# Patient Record
Sex: MALE | Race: ASIAN | NOT HISPANIC OR LATINO
[De-identification: names, ages, dates, MRNs, and addresses within clinical notes are randomized per-mention and may not be internally consistent; named-entity substitution may affect disease eponyms.]

---

## 2017-03-25 ENCOUNTER — APPOINTMENT (OUTPATIENT)
Dept: INTERNAL MEDICINE | Facility: CLINIC | Age: 37
End: 2017-03-25

## 2017-03-25 ENCOUNTER — LABORATORY RESULT (OUTPATIENT)
Age: 37
End: 2017-03-25

## 2017-03-25 VITALS
DIASTOLIC BLOOD PRESSURE: 80 MMHG | SYSTOLIC BLOOD PRESSURE: 118 MMHG | WEIGHT: 180 LBS | BODY MASS INDEX: 28.93 KG/M2 | HEIGHT: 66 IN

## 2017-03-30 LAB
ALBUMIN SERPL ELPH-MCNC: 4.5 G/DL
ALP BLD-CCNC: 72 U/L
ALT SERPL-CCNC: 66 U/L
ANION GAP SERPL CALC-SCNC: 15 MMOL/L
AST SERPL-CCNC: 38 U/L
BILIRUB SERPL-MCNC: 0.8 MG/DL
BUN SERPL-MCNC: 16 MG/DL
CALCIUM SERPL-MCNC: 9.8 MG/DL
CHLORIDE SERPL-SCNC: 100 MMOL/L
CHOLEST SERPL-MCNC: 189 MG/DL
CHOLEST/HDLC SERPL: 4 RATIO
CO2 SERPL-SCNC: 22 MMOL/L
CREAT SERPL-MCNC: 1.01 MG/DL
GLUCOSE SERPL-MCNC: 90 MG/DL
HDLC SERPL-MCNC: 47 MG/DL
LDLC SERPL CALC-MCNC: 117 MG/DL
POTASSIUM SERPL-SCNC: 4 MMOL/L
PROT SERPL-MCNC: 7.6 G/DL
SODIUM SERPL-SCNC: 137 MMOL/L
TRIGL SERPL-MCNC: 123 MG/DL
URATE SERPL-MCNC: 10.5 MG/DL

## 2017-07-18 ENCOUNTER — APPOINTMENT (OUTPATIENT)
Dept: INTERNAL MEDICINE | Facility: CLINIC | Age: 37
End: 2017-07-18

## 2017-07-18 VITALS
HEIGHT: 66 IN | DIASTOLIC BLOOD PRESSURE: 80 MMHG | SYSTOLIC BLOOD PRESSURE: 116 MMHG | WEIGHT: 185 LBS | BODY MASS INDEX: 29.73 KG/M2

## 2017-10-02 ENCOUNTER — RX RENEWAL (OUTPATIENT)
Age: 37
End: 2017-10-02

## 2017-11-11 ENCOUNTER — LABORATORY RESULT (OUTPATIENT)
Age: 37
End: 2017-11-11

## 2017-11-18 ENCOUNTER — APPOINTMENT (OUTPATIENT)
Dept: INTERNAL MEDICINE | Facility: CLINIC | Age: 37
End: 2017-11-18

## 2017-12-07 ENCOUNTER — APPOINTMENT (OUTPATIENT)
Dept: ORTHOPEDIC SURGERY | Facility: CLINIC | Age: 37
End: 2017-12-07
Payer: COMMERCIAL

## 2017-12-07 ENCOUNTER — APPOINTMENT (OUTPATIENT)
Dept: INTERNAL MEDICINE | Facility: CLINIC | Age: 37
End: 2017-12-07
Payer: COMMERCIAL

## 2017-12-07 VITALS
WEIGHT: 183 LBS | DIASTOLIC BLOOD PRESSURE: 93 MMHG | BODY MASS INDEX: 27.74 KG/M2 | SYSTOLIC BLOOD PRESSURE: 154 MMHG | HEIGHT: 68 IN | HEART RATE: 83 BPM

## 2017-12-07 PROCEDURE — 73562 X-RAY EXAM OF KNEE 3: CPT | Mod: LT

## 2017-12-07 PROCEDURE — 99203 OFFICE O/P NEW LOW 30 MIN: CPT

## 2017-12-07 PROCEDURE — G0008: CPT

## 2017-12-07 PROCEDURE — 90688 IIV4 VACCINE SPLT 0.5 ML IM: CPT

## 2017-12-09 ENCOUNTER — MOBILE ON CALL (OUTPATIENT)
Age: 37
End: 2017-12-09

## 2018-01-07 ENCOUNTER — EMERGENCY (EMERGENCY)
Facility: HOSPITAL | Age: 38
LOS: 1 days | Discharge: HOME | End: 2018-01-07
Attending: EMERGENCY MEDICINE | Admitting: EMERGENCY MEDICINE
Payer: COMMERCIAL

## 2018-01-07 VITALS
DIASTOLIC BLOOD PRESSURE: 87 MMHG | RESPIRATION RATE: 18 BRPM | TEMPERATURE: 98 F | SYSTOLIC BLOOD PRESSURE: 134 MMHG | OXYGEN SATURATION: 99 % | WEIGHT: 184.09 LBS | HEIGHT: 68 IN | HEART RATE: 87 BPM

## 2018-01-07 DIAGNOSIS — Z90.49 ACQUIRED ABSENCE OF OTHER SPECIFIED PARTS OF DIGESTIVE TRACT: Chronic | ICD-10-CM

## 2018-01-07 PROCEDURE — 73630 X-RAY EXAM OF FOOT: CPT

## 2018-01-07 PROCEDURE — 73590 X-RAY EXAM OF LOWER LEG: CPT | Mod: 26,LT

## 2018-01-07 PROCEDURE — 73630 X-RAY EXAM OF FOOT: CPT | Mod: 26,LT

## 2018-01-07 PROCEDURE — 27810 TREATMENT OF ANKLE FRACTURE: CPT | Mod: LT

## 2018-01-07 PROCEDURE — 73590 X-RAY EXAM OF LOWER LEG: CPT

## 2018-01-07 PROCEDURE — 73610 X-RAY EXAM OF ANKLE: CPT

## 2018-01-07 PROCEDURE — 73600 X-RAY EXAM OF ANKLE: CPT

## 2018-01-07 PROCEDURE — 99284 EMERGENCY DEPT VISIT MOD MDM: CPT

## 2018-01-07 PROCEDURE — 99285 EMERGENCY DEPT VISIT HI MDM: CPT | Mod: 25

## 2018-01-07 PROCEDURE — 73610 X-RAY EXAM OF ANKLE: CPT | Mod: 26,LT

## 2018-01-07 RX ORDER — ACETAMINOPHEN 500 MG
975 TABLET ORAL ONCE
Qty: 0 | Refills: 0 | Status: COMPLETED | OUTPATIENT
Start: 2018-01-07 | End: 2018-01-07

## 2018-01-07 RX ORDER — IBUPROFEN 200 MG
600 TABLET ORAL ONCE
Qty: 0 | Refills: 0 | Status: COMPLETED | OUTPATIENT
Start: 2018-01-07 | End: 2018-01-07

## 2018-01-07 RX ORDER — OXYCODONE HYDROCHLORIDE 5 MG/1
5 TABLET ORAL ONCE
Qty: 0 | Refills: 0 | Status: DISCONTINUED | OUTPATIENT
Start: 2018-01-07 | End: 2018-01-07

## 2018-01-07 RX ORDER — OXYCODONE HYDROCHLORIDE 5 MG/1
1 TABLET ORAL
Qty: 20 | Refills: 0 | OUTPATIENT
Start: 2018-01-07 | End: 2018-01-11

## 2018-01-07 RX ADMIN — Medication 600 MILLIGRAM(S): at 20:00

## 2018-01-07 RX ADMIN — Medication 975 MILLIGRAM(S): at 20:00

## 2018-01-07 RX ADMIN — OXYCODONE HYDROCHLORIDE 5 MILLIGRAM(S): 5 TABLET ORAL at 20:00

## 2018-01-07 NOTE — ED ADULT NURSE REASSESSMENT NOTE - NS ED NURSE REASSESS COMMENT FT1
Pt. instructed on how to use crutches. Pt. verbalized and was able to show RN proper use of crutches.

## 2018-01-07 NOTE — CONSULT NOTE ADULT - ASSESSMENT
37M with L bimalleolar equivalent ankle fracture  - NWB LLE in trilam splint  - Pain control  - Elevate  - Crutches for ambulation  - Follow-up with Dr. Sweet as requested by patient [phone]

## 2018-01-07 NOTE — ED PROVIDER NOTE - PLAN OF CARE
Follow up with orthopedics in 2-3 days, call [phone] for an appointment   Take ibuprofen 600 mg every 8 hours as needed for pain with food and plenty of water  Rest, ice or heat packs as needed for discomfort.  Follow up with your primary doctor within the next 1-2 days  If you have any worsening or changing symptoms such as confusion, loss of consciousness, worsening pain, nausea/vomiting, or if you have any other concerns please return to the emergency department

## 2018-01-07 NOTE — ED PROVIDER NOTE - PROGRESS NOTE DETAILS
Patient was evaluated by me, found in no acute distress, calm, speaking in complete sentences. Physical exam is concerning for left ankle sprain vs fracture. Left lower extremity found neurovascularly intact. X-rays ordered to assess for fractures/dislocations. I agree with resident assessment and plan. Dr. Carlos Cannon Pt's evaluated by ortho and applied splint by ortho. recommended to f/u with Dr. Sweet in this week. Pt's evaluated by ortho and applied splint by ortho. recommended to f/u with Dr. Sweet in this week. N/V- intact. crutches given with well demo.

## 2018-01-07 NOTE — ED PROVIDER NOTE - MEDICAL DECISION MAKING DETAILS
Male patient with past hx of HTN, brought to ED after injuring left ankle after slipping on ice earlier today. Physical exam is concerning for left ankle sprain vs fracture. Left lower extremity found neurovascularly intact. X-rays ordered to assess for fractures/dislocations.

## 2018-01-07 NOTE — ED PROVIDER NOTE - PHYSICAL EXAMINATION
NAD, VSS, Afebrile, No facial or scalp tender, No spinal tender, No RIB or CVA tender, + left ankle lat mal swelling, tender, diminished ROMs, no proximal tib/fib tender, No metatarsal tender, N/V- intact.

## 2018-01-07 NOTE — ED PROVIDER NOTE - CARE PLAN
Principal Discharge DX:	Ankle fracture, bimalleolar, closed, left, initial encounter Principal Discharge DX:	Ankle fracture, bimalleolar, closed, left, initial encounter  Instructions for follow-up, activity and diet:	Follow up with orthopedics in 2-3 days, call [phone] for an appointment   Take ibuprofen 600 mg every 8 hours as needed for pain with food and plenty of water  Rest, ice or heat packs as needed for discomfort.  Follow up with your primary doctor within the next 1-2 days  If you have any worsening or changing symptoms such as confusion, loss of consciousness, worsening pain, nausea/vomiting, or if you have any other concerns please return to the emergency department

## 2018-01-07 NOTE — CONSULT NOTE ADULT - SUBJECTIVE AND OBJECTIVE BOX
CC: L ankle pain  HPI:   37M s/p slip on ice with left ankle pain. States that he twisted his left ankle. Denies any other injuries. Denies head strike or LOC. No n/v, no numbness/tingling. Unable to weight bear afterwards.     Review of systems: As per HPI, otherwise negative.     PAST MEDICAL & SURGICAL HISTORY:  Hypertension  History of appendectomy    FAMILY HISTORY:      MEDICATIONS  (STANDING):    MEDICATIONS  (PRN):      T(C): 36.6 (01-07-18 @ 19:29), Max: 36.6 (01-07-18 @ 19:29)  HR: 87 (01-07-18 @ 19:29) (87 - 87)  BP: 134/87 (01-07-18 @ 19:29) (134/87 - 134/87)  RR: 18 (01-07-18 @ 19:29) (18 - 18)  SpO2: 99% (01-07-18 @ 19:29) (99% - 99%)  Wt(kg): --      EXAM:  Awake, Alert and in no acute distress  LLE ankle edematous  TTP lateral and medial malleoli  Skin intact no lesions  Compartments soft and compressible  EHL/FHL/TA/GS intact  SILT SP/DP/T/S/S  WWP brisk cap refill 2+ DP pulse    Imaging  XR L ankle/tibfib - bimalleolar equivalent ankle fracture    Procedure - 1% lidocaine intraarticular block, closed reduction, trilam splint  Post-proc PE - NVI  Post-proc XR - acceptable alignment

## 2018-01-07 NOTE — ED ADULT NURSE NOTE - OBJECTIVE STATEMENT
36 y/o male presents to the ED via wheelchair with wife. A&Ox3. C/O L ankle pain. Pt. states he fell and twisted his ankle. No LOC and pt. denies hitting head. +edema present of L ankle. +easy work of breathing. peripheral pulse present, cap refill<2 seconds. L leg elevated with ice pack placed. skin is intact, dry, and warm to touch. Pt. denies dizziness, headache, sob, chest pain, n/v/d, or numbness and tingling.

## 2018-01-09 ENCOUNTER — APPOINTMENT (OUTPATIENT)
Dept: ORTHOPEDIC SURGERY | Facility: CLINIC | Age: 38
End: 2018-01-09
Payer: COMMERCIAL

## 2018-01-09 ENCOUNTER — LABORATORY RESULT (OUTPATIENT)
Age: 38
End: 2018-01-09

## 2018-01-09 ENCOUNTER — APPOINTMENT (OUTPATIENT)
Dept: INTERNAL MEDICINE | Facility: CLINIC | Age: 38
End: 2018-01-09
Payer: COMMERCIAL

## 2018-01-09 VITALS
BODY MASS INDEX: 27.74 KG/M2 | DIASTOLIC BLOOD PRESSURE: 79 MMHG | HEIGHT: 68 IN | HEART RATE: 96 BPM | SYSTOLIC BLOOD PRESSURE: 120 MMHG | WEIGHT: 183 LBS

## 2018-01-09 VITALS
SYSTOLIC BLOOD PRESSURE: 110 MMHG | TEMPERATURE: 98.1 F | HEIGHT: 68 IN | BODY MASS INDEX: 27.74 KG/M2 | DIASTOLIC BLOOD PRESSURE: 70 MMHG | WEIGHT: 183 LBS

## 2018-01-09 PROCEDURE — 99215 OFFICE O/P EST HI 40 MIN: CPT

## 2018-01-09 PROCEDURE — 99214 OFFICE O/P EST MOD 30 MIN: CPT | Mod: 25

## 2018-01-09 PROCEDURE — 73610 X-RAY EXAM OF ANKLE: CPT | Mod: LT

## 2018-01-09 PROCEDURE — 93000 ELECTROCARDIOGRAM COMPLETE: CPT

## 2018-01-09 PROCEDURE — 36415 COLL VENOUS BLD VENIPUNCTURE: CPT

## 2018-01-10 ENCOUNTER — OUTPATIENT (OUTPATIENT)
Dept: OUTPATIENT SERVICES | Facility: HOSPITAL | Age: 38
LOS: 1 days | End: 2018-01-10
Payer: COMMERCIAL

## 2018-01-10 ENCOUNTER — TRANSCRIPTION ENCOUNTER (OUTPATIENT)
Age: 38
End: 2018-01-10

## 2018-01-10 VITALS
WEIGHT: 184.09 LBS | HEIGHT: 68 IN | SYSTOLIC BLOOD PRESSURE: 125 MMHG | DIASTOLIC BLOOD PRESSURE: 86 MMHG | OXYGEN SATURATION: 98 % | TEMPERATURE: 98 F | RESPIRATION RATE: 16 BRPM | HEART RATE: 87 BPM

## 2018-01-10 DIAGNOSIS — Z01.818 ENCOUNTER FOR OTHER PREPROCEDURAL EXAMINATION: ICD-10-CM

## 2018-01-10 DIAGNOSIS — Z90.49 ACQUIRED ABSENCE OF OTHER SPECIFIED PARTS OF DIGESTIVE TRACT: Chronic | ICD-10-CM

## 2018-01-10 DIAGNOSIS — S82.892A OTHER FRACTURE OF LEFT LOWER LEG, INITIAL ENCOUNTER FOR CLOSED FRACTURE: ICD-10-CM

## 2018-01-10 DIAGNOSIS — I10 ESSENTIAL (PRIMARY) HYPERTENSION: ICD-10-CM

## 2018-01-10 LAB
ALBUMIN SERPL ELPH-MCNC: 4.4 G/DL
ALP BLD-CCNC: 72 U/L
ALT SERPL-CCNC: 53 U/L
ANION GAP SERPL CALC-SCNC: 18 MMOL/L
APTT BLD: 30.6 SEC
AST SERPL-CCNC: 22 U/L
BASOPHILS # BLD AUTO: 0.03 K/UL
BASOPHILS NFR BLD AUTO: 0.2 %
BILIRUB SERPL-MCNC: 0.7 MG/DL
BUN SERPL-MCNC: 17 MG/DL
CALCIUM SERPL-MCNC: 9.8 MG/DL
CHLORIDE SERPL-SCNC: 97 MMOL/L
CO2 SERPL-SCNC: 22 MMOL/L
CREAT SERPL-MCNC: 0.95 MG/DL
EOSINOPHIL # BLD AUTO: 0.08 K/UL
EOSINOPHIL NFR BLD AUTO: 0.5 %
GLUCOSE SERPL-MCNC: 127 MG/DL
HCT VFR BLD CALC: 44.2 %
HGB BLD-MCNC: 14.5 G/DL
IMM GRANULOCYTES NFR BLD AUTO: 0.3 %
INR PPP: 0.97 RATIO
LYMPHOCYTES # BLD AUTO: 2.66 K/UL
LYMPHOCYTES NFR BLD AUTO: 17.5 %
MAN DIFF?: NORMAL
MCHC RBC-ENTMCNC: 28.9 PG
MCHC RBC-ENTMCNC: 32.8 GM/DL
MCV RBC AUTO: 88.2 FL
MONOCYTES # BLD AUTO: 0.94 K/UL
MONOCYTES NFR BLD AUTO: 6.2 %
NEUTROPHILS # BLD AUTO: 11.44 K/UL
NEUTROPHILS NFR BLD AUTO: 75.3 %
PLATELET # BLD AUTO: 301 K/UL
POTASSIUM SERPL-SCNC: 4.1 MMOL/L
PROT SERPL-MCNC: 8.3 G/DL
PT BLD: 10.9 SEC
RBC # BLD: 5.01 M/UL
RBC # FLD: 13.5 %
SODIUM SERPL-SCNC: 137 MMOL/L
WBC # FLD AUTO: 15.2 K/UL

## 2018-01-10 RX ORDER — CEFAZOLIN SODIUM 1 G
2000 VIAL (EA) INJECTION ONCE
Qty: 0 | Refills: 0 | Status: DISCONTINUED | OUTPATIENT
Start: 2018-01-12 | End: 2018-01-27

## 2018-01-10 RX ORDER — LISINOPRIL 2.5 MG/1
0 TABLET ORAL
Qty: 0 | Refills: 0 | COMMUNITY

## 2018-01-10 RX ORDER — HYDROCHLOROTHIAZIDE 25 MG
0 TABLET ORAL
Qty: 0 | Refills: 0 | COMMUNITY

## 2018-01-10 NOTE — H&P PST ADULT - MUSCULOSKELETAL COMMENTS
hx fall JAN 6,2018 sustained ankle facture seen in ER  sent home with meds for pain and immobilization of affected ankle left anle with lower leg hard open cast with ace bandage in place  distal toes with good circulation and movement , walks with 2 point crutches non bearing

## 2018-01-10 NOTE — H&P PST ADULT - HISTORY OF PRESENT ILLNESS
36 y/o male with history of hypertension, came in  for today for open reduction internal fixation of left ankle. Patient sustained injury due to fall Jan 6,2018, Patient was seen in ER had Xray done positive for fracture, affected site immobilizes with open lower leg cast and ace bandage dressing and crutches, was  referred to Dr Nazario evaluated and scheduled this surgical  procedure for treatment .Seen by PMD 1/9/2018 blood test done WBC elevated due to fracture ok for surgery will follow-up post op. 36 y/o male with history of hypertension, came in  for PST  today for open reduction internal fixation of left ankle. Patient sustained injury due to fall Jan 6,2018, Patient was seen in ER had Xray done positive for fracture, affected site immobilizes with open lower leg cast and ace bandage dressing and crutches, was  referred to Dr Nazario evaluated and scheduled this surgical  procedure for treatment .Seen by PMD 1/9/2018 blood test done WBC elevated due to fracture ok for surgery will follow-up post op.

## 2018-01-10 NOTE — H&P PST ADULT - PMH
Fatty liver  benign follow-up by PMD  Hypertension  on medication Fatty liver  benign follow-up by PMD  Gout  on PMDS note /stable as per patient  Hypertension  on medication

## 2018-01-10 NOTE — H&P PST ADULT - PROBLEM SELECTOR PLAN 1
Open reduction internal fixation left ankle   CBC/BMP result on HIE done by PMD printed WBC elevated due to stress fracture as per PMDS note   To continue taking pain meds as needed for control of pain    Continue left ankle immobilization hard open leg cast  for support and movement uses two point weight bearing crutches for mobilization   To watch affected distal toes for color and movement and to call surgeon PRN of any changes in color or become /cyanotic   PSt instruction given to patient verbalizes understanding antibacterial soap given , prophylaxis for infection post.op

## 2018-01-10 NOTE — H&P PST ADULT - EXTREMITIES COMMENTS
left ankle immobilized with lower leg below the knee open cast with ace bandage wrap, distal toes with mild swelling with good movement and circulation

## 2018-01-10 NOTE — H&P PST ADULT - NSANTHOSAYNRD_GEN_A_CORE
No. BEBE screening performed.  STOP BANG Legend: 0-2 = LOW Risk; 3-4 = INTERMEDIATE Risk; 5-8 = HIGH Risk

## 2018-01-11 ENCOUNTER — TRANSCRIPTION ENCOUNTER (OUTPATIENT)
Age: 38
End: 2018-01-11

## 2018-01-12 ENCOUNTER — TRANSCRIPTION ENCOUNTER (OUTPATIENT)
Age: 38
End: 2018-01-12

## 2018-01-12 ENCOUNTER — OUTPATIENT (OUTPATIENT)
Dept: OUTPATIENT SERVICES | Facility: HOSPITAL | Age: 38
LOS: 1 days | End: 2018-01-12
Payer: COMMERCIAL

## 2018-01-12 ENCOUNTER — APPOINTMENT (OUTPATIENT)
Dept: ORTHOPEDIC SURGERY | Facility: HOSPITAL | Age: 38
End: 2018-01-12

## 2018-01-12 VITALS
SYSTOLIC BLOOD PRESSURE: 118 MMHG | DIASTOLIC BLOOD PRESSURE: 74 MMHG | HEART RATE: 87 BPM | WEIGHT: 184.09 LBS | TEMPERATURE: 98 F | OXYGEN SATURATION: 96 % | HEIGHT: 68 IN | RESPIRATION RATE: 18 BRPM

## 2018-01-12 VITALS — HEART RATE: 84 BPM | DIASTOLIC BLOOD PRESSURE: 66 MMHG | OXYGEN SATURATION: 99 % | SYSTOLIC BLOOD PRESSURE: 128 MMHG

## 2018-01-12 DIAGNOSIS — S82.892A OTHER FRACTURE OF LEFT LOWER LEG, INITIAL ENCOUNTER FOR CLOSED FRACTURE: ICD-10-CM

## 2018-01-12 DIAGNOSIS — Z90.49 ACQUIRED ABSENCE OF OTHER SPECIFIED PARTS OF DIGESTIVE TRACT: Chronic | ICD-10-CM

## 2018-01-12 DIAGNOSIS — Z01.818 ENCOUNTER FOR OTHER PREPROCEDURAL EXAMINATION: ICD-10-CM

## 2018-01-12 PROCEDURE — 27814 TREATMENT OF ANKLE FRACTURE: CPT | Mod: LT

## 2018-01-12 PROCEDURE — 76000 FLUOROSCOPY <1 HR PHYS/QHP: CPT

## 2018-01-12 PROCEDURE — 27829 TREAT LOWER LEG JOINT: CPT | Mod: LT

## 2018-01-12 PROCEDURE — C1713: CPT

## 2018-01-12 PROCEDURE — 27792 TREATMENT OF ANKLE FRACTURE: CPT | Mod: LT

## 2018-01-12 PROCEDURE — G0463: CPT

## 2018-01-12 RX ORDER — ASPIRIN/CALCIUM CARB/MAGNESIUM 324 MG
1 TABLET ORAL
Qty: 30 | Refills: 0 | OUTPATIENT
Start: 2018-01-12 | End: 2018-02-10

## 2018-01-12 RX ORDER — ONDANSETRON 8 MG/1
4 TABLET, FILM COATED ORAL ONCE
Qty: 0 | Refills: 0 | Status: DISCONTINUED | OUTPATIENT
Start: 2018-01-12 | End: 2018-01-27

## 2018-01-12 RX ORDER — SODIUM CHLORIDE 9 MG/ML
1000 INJECTION, SOLUTION INTRAVENOUS
Qty: 0 | Refills: 0 | Status: DISCONTINUED | OUTPATIENT
Start: 2018-01-12 | End: 2018-01-27

## 2018-01-12 RX ORDER — SODIUM CHLORIDE 9 MG/ML
3 INJECTION INTRAMUSCULAR; INTRAVENOUS; SUBCUTANEOUS EVERY 8 HOURS
Qty: 0 | Refills: 0 | Status: DISCONTINUED | OUTPATIENT
Start: 2018-01-12 | End: 2018-01-12

## 2018-01-12 RX ORDER — OXYCODONE HYDROCHLORIDE 5 MG/1
1 TABLET ORAL
Qty: 16 | Refills: 0 | OUTPATIENT
Start: 2018-01-12 | End: 2018-01-15

## 2018-01-12 RX ORDER — LIDOCAINE HCL 20 MG/ML
0.2 VIAL (ML) INJECTION ONCE
Qty: 0 | Refills: 0 | Status: DISCONTINUED | OUTPATIENT
Start: 2018-01-12 | End: 2018-01-12

## 2018-01-12 RX ORDER — HYDROMORPHONE HYDROCHLORIDE 2 MG/ML
0.5 INJECTION INTRAMUSCULAR; INTRAVENOUS; SUBCUTANEOUS
Qty: 0 | Refills: 0 | Status: DISCONTINUED | OUTPATIENT
Start: 2018-01-12 | End: 2018-01-12

## 2018-01-12 RX ADMIN — HYDROMORPHONE HYDROCHLORIDE 0.5 MILLIGRAM(S): 2 INJECTION INTRAMUSCULAR; INTRAVENOUS; SUBCUTANEOUS at 17:00

## 2018-01-12 RX ADMIN — HYDROMORPHONE HYDROCHLORIDE 0.5 MILLIGRAM(S): 2 INJECTION INTRAMUSCULAR; INTRAVENOUS; SUBCUTANEOUS at 17:30

## 2018-01-12 NOTE — ASU DISCHARGE PLAN (ADULT/PEDIATRIC). - MEDICATION SUMMARY - MEDICATIONS TO TAKE
I will START or STAY ON the medications listed below when I get home from the hospital:    Ecotrin 325 mg oral delayed release tablet  -- 1 tab(s) by mouth once a day   -- Swallow whole.  Do not crush.  Take with food or milk.    -- Indication: For Other fracture of left lower leg, initial encounter for closed fracture    oxyCODONE 5 mg oral tablet  -- 1 tab(s) by mouth every 6 hours MDD:4  -- Caution federal law prohibits the transfer of this drug to any person other  than the person for whom it was prescribed.  It is very important that you take or use this exactly as directed.  Do not skip doses or discontinue unless directed by your doctor.  May cause drowsiness.  Alcohol may intensify this effect.  Use care when operating dangerous machinery.  This prescription cannot be refilled.  Using more of this medication than prescribed may cause serious breathing problems.    -- Indication: For Other fracture of left lower leg, initial encounter for closed fracture

## 2018-01-12 NOTE — BRIEF OPERATIVE NOTE - PROCEDURE
<<-----Click on this checkbox to enter Procedure Ankle surgery  01/12/2018  Left lateral malleolar ORIF  Active  ANGELA

## 2018-01-12 NOTE — ASU DISCHARGE PLAN (ADULT/PEDIATRIC). - SPECIAL INSTRUCTIONS
Patient was advised to follow-up with their surgeon in 1-2 weeks and call the office with any questions or concerns.

## 2018-01-23 ENCOUNTER — APPOINTMENT (OUTPATIENT)
Dept: ORTHOPEDIC SURGERY | Facility: CLINIC | Age: 38
End: 2018-01-23
Payer: COMMERCIAL

## 2018-01-23 PROCEDURE — 99024 POSTOP FOLLOW-UP VISIT: CPT

## 2018-01-23 PROCEDURE — 73610 X-RAY EXAM OF ANKLE: CPT | Mod: LT

## 2018-02-12 ENCOUNTER — APPOINTMENT (OUTPATIENT)
Dept: ORTHOPEDIC SURGERY | Facility: CLINIC | Age: 38
End: 2018-02-12
Payer: COMMERCIAL

## 2018-02-12 PROCEDURE — 99024 POSTOP FOLLOW-UP VISIT: CPT

## 2018-02-12 PROCEDURE — 73610 X-RAY EXAM OF ANKLE: CPT | Mod: LT

## 2018-03-07 ENCOUNTER — APPOINTMENT (OUTPATIENT)
Dept: ORTHOPEDIC SURGERY | Facility: CLINIC | Age: 38
End: 2018-03-07
Payer: COMMERCIAL

## 2018-03-07 PROCEDURE — 99024 POSTOP FOLLOW-UP VISIT: CPT

## 2018-03-12 ENCOUNTER — INPATIENT (INPATIENT)
Facility: HOSPITAL | Age: 38
LOS: 6 days | Discharge: HOME | DRG: 857 | End: 2018-03-19
Attending: ORTHOPAEDIC SURGERY | Admitting: ORTHOPAEDIC SURGERY
Payer: COMMERCIAL

## 2018-03-12 ENCOUNTER — APPOINTMENT (OUTPATIENT)
Dept: ORTHOPEDIC SURGERY | Facility: CLINIC | Age: 38
End: 2018-03-12
Payer: COMMERCIAL

## 2018-03-12 VITALS
OXYGEN SATURATION: 98 % | RESPIRATION RATE: 18 BRPM | HEART RATE: 95 BPM | TEMPERATURE: 99 F | DIASTOLIC BLOOD PRESSURE: 86 MMHG | SYSTOLIC BLOOD PRESSURE: 135 MMHG

## 2018-03-12 DIAGNOSIS — Z90.49 ACQUIRED ABSENCE OF OTHER SPECIFIED PARTS OF DIGESTIVE TRACT: Chronic | ICD-10-CM

## 2018-03-12 PROCEDURE — 73610 X-RAY EXAM OF ANKLE: CPT | Mod: LT

## 2018-03-12 PROCEDURE — 99285 EMERGENCY DEPT VISIT HI MDM: CPT | Mod: 25

## 2018-03-12 PROCEDURE — 99024 POSTOP FOLLOW-UP VISIT: CPT

## 2018-03-12 NOTE — ED ADULT NURSE NOTE - PMH
Fatty liver  benign follow-up by PMD  Gout  on PMDS note /stable as per patient  Hypertension  on medication

## 2018-03-12 NOTE — ED ADULT NURSE NOTE - OBJECTIVE STATEMENT
37 year old male came into the ER via walk-in with c/o left foot/ankle swelling s/p ankle fx surgery in january. Pt states he had a fever approx 2 weeks ago, but nothing since then. 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 Pt was seen at orthopedist outpatient today who noted edema and sent pt to ER. Upon arrival to ER, pt left foot and ankle swollen, non pitting, limited ROM. Pt to be seen by ortho/surgery for hardware cleaning. Pt has no c/o pain at this time, no CP, SOB, ABD pain, N/V/D, HA, fever or chills noted at this time. Comfort and safety maintained.

## 2018-03-13 DIAGNOSIS — T81.4XXA INFECTION FOLLOWING A PROCEDURE, INITIAL ENCOUNTER: ICD-10-CM

## 2018-03-13 LAB
ALBUMIN SERPL ELPH-MCNC: 4.3 G/DL — SIGNIFICANT CHANGE UP (ref 3.3–5)
ALP SERPL-CCNC: 97 U/L — SIGNIFICANT CHANGE UP (ref 40–120)
ALT FLD-CCNC: 38 U/L RC — SIGNIFICANT CHANGE UP (ref 10–45)
ANION GAP SERPL CALC-SCNC: 16 MMOL/L — SIGNIFICANT CHANGE UP (ref 5–17)
APPEARANCE UR: CLEAR — SIGNIFICANT CHANGE UP
APTT BLD: 35.3 SEC — SIGNIFICANT CHANGE UP (ref 27.5–37.4)
AST SERPL-CCNC: 18 U/L — SIGNIFICANT CHANGE UP (ref 10–40)
BASOPHILS # BLD AUTO: 0.2 K/UL — SIGNIFICANT CHANGE UP (ref 0–0.2)
BASOPHILS NFR BLD AUTO: 1.3 % — SIGNIFICANT CHANGE UP (ref 0–2)
BILIRUB SERPL-MCNC: 0.4 MG/DL — SIGNIFICANT CHANGE UP (ref 0.2–1.2)
BILIRUB UR-MCNC: NEGATIVE — SIGNIFICANT CHANGE UP
BLD GP AB SCN SERPL QL: NEGATIVE — SIGNIFICANT CHANGE UP
BUN SERPL-MCNC: 15 MG/DL — SIGNIFICANT CHANGE UP (ref 7–23)
CALCIUM SERPL-MCNC: 10.5 MG/DL — SIGNIFICANT CHANGE UP (ref 8.4–10.5)
CHLORIDE SERPL-SCNC: 99 MMOL/L — SIGNIFICANT CHANGE UP (ref 96–108)
CO2 SERPL-SCNC: 24 MMOL/L — SIGNIFICANT CHANGE UP (ref 22–31)
COLOR SPEC: YELLOW — SIGNIFICANT CHANGE UP
CREAT SERPL-MCNC: 0.97 MG/DL — SIGNIFICANT CHANGE UP (ref 0.5–1.3)
CRP SERPL-MCNC: 0.8 MG/DL — HIGH (ref 0–0.4)
DIFF PNL FLD: NEGATIVE — SIGNIFICANT CHANGE UP
EOSINOPHIL # BLD AUTO: 0.3 K/UL — SIGNIFICANT CHANGE UP (ref 0–0.5)
EOSINOPHIL NFR BLD AUTO: 1.8 % — SIGNIFICANT CHANGE UP (ref 0–6)
ERYTHROCYTE [SEDIMENTATION RATE] IN BLOOD: 28 MM/HR — HIGH (ref 0–15)
GLUCOSE SERPL-MCNC: 86 MG/DL — SIGNIFICANT CHANGE UP (ref 70–99)
GLUCOSE UR QL: NEGATIVE — SIGNIFICANT CHANGE UP
HCT VFR BLD CALC: 42.1 % — SIGNIFICANT CHANGE UP (ref 39–50)
HGB BLD-MCNC: 14.5 G/DL — SIGNIFICANT CHANGE UP (ref 13–17)
INR BLD: 1.09 RATIO — SIGNIFICANT CHANGE UP (ref 0.88–1.16)
KETONES UR-MCNC: NEGATIVE — SIGNIFICANT CHANGE UP
LEUKOCYTE ESTERASE UR-ACNC: NEGATIVE — SIGNIFICANT CHANGE UP
LYMPHOCYTES # BLD AUTO: 33.5 % — SIGNIFICANT CHANGE UP (ref 13–44)
LYMPHOCYTES # BLD AUTO: 4.6 K/UL — HIGH (ref 1–3.3)
MCHC RBC-ENTMCNC: 30.6 PG — SIGNIFICANT CHANGE UP (ref 27–34)
MCHC RBC-ENTMCNC: 34.5 GM/DL — SIGNIFICANT CHANGE UP (ref 32–36)
MCV RBC AUTO: 88.8 FL — SIGNIFICANT CHANGE UP (ref 80–100)
MONOCYTES # BLD AUTO: 1 K/UL — HIGH (ref 0–0.9)
MONOCYTES NFR BLD AUTO: 7.5 % — SIGNIFICANT CHANGE UP (ref 2–14)
NEUTROPHILS # BLD AUTO: 7.7 K/UL — HIGH (ref 1.8–7.4)
NEUTROPHILS NFR BLD AUTO: 55.9 % — SIGNIFICANT CHANGE UP (ref 43–77)
NITRITE UR-MCNC: NEGATIVE — SIGNIFICANT CHANGE UP
PH UR: 7 — SIGNIFICANT CHANGE UP (ref 5–8)
PLATELET # BLD AUTO: 505 K/UL — HIGH (ref 150–400)
POTASSIUM SERPL-MCNC: 4.1 MMOL/L — SIGNIFICANT CHANGE UP (ref 3.5–5.3)
POTASSIUM SERPL-SCNC: 4.1 MMOL/L — SIGNIFICANT CHANGE UP (ref 3.5–5.3)
PROT SERPL-MCNC: 8.6 G/DL — HIGH (ref 6–8.3)
PROT UR-MCNC: NEGATIVE — SIGNIFICANT CHANGE UP
PROTHROM AB SERPL-ACNC: 11.9 SEC — SIGNIFICANT CHANGE UP (ref 9.8–12.7)
RBC # BLD: 4.75 M/UL — SIGNIFICANT CHANGE UP (ref 4.2–5.8)
RBC # FLD: 12 % — SIGNIFICANT CHANGE UP (ref 10.3–14.5)
RH IG SCN BLD-IMP: POSITIVE — SIGNIFICANT CHANGE UP
RH IG SCN BLD-IMP: POSITIVE — SIGNIFICANT CHANGE UP
SODIUM SERPL-SCNC: 139 MMOL/L — SIGNIFICANT CHANGE UP (ref 135–145)
SP GR SPEC: 1.01 — SIGNIFICANT CHANGE UP (ref 1.01–1.02)
UROBILINOGEN FLD QL: NEGATIVE — SIGNIFICANT CHANGE UP
WBC # BLD: 13.8 K/UL — HIGH (ref 3.8–10.5)
WBC # FLD AUTO: 13.8 K/UL — HIGH (ref 3.8–10.5)

## 2018-03-13 PROCEDURE — 71045 X-RAY EXAM CHEST 1 VIEW: CPT | Mod: 26

## 2018-03-13 PROCEDURE — 73610 X-RAY EXAM OF ANKLE: CPT | Mod: 26,LT

## 2018-03-13 RX ORDER — ACETAMINOPHEN 500 MG
650 TABLET ORAL EVERY 6 HOURS
Qty: 0 | Refills: 0 | Status: DISCONTINUED | OUTPATIENT
Start: 2018-03-13 | End: 2018-03-14

## 2018-03-13 RX ORDER — SODIUM CHLORIDE 9 MG/ML
1000 INJECTION INTRAMUSCULAR; INTRAVENOUS; SUBCUTANEOUS
Qty: 0 | Refills: 0 | Status: DISCONTINUED | OUTPATIENT
Start: 2018-03-13 | End: 2018-03-14

## 2018-03-13 RX ORDER — SODIUM CHLORIDE 9 MG/ML
1000 INJECTION INTRAMUSCULAR; INTRAVENOUS; SUBCUTANEOUS
Qty: 0 | Refills: 0 | Status: DISCONTINUED | OUTPATIENT
Start: 2018-03-13 | End: 2018-03-13

## 2018-03-13 RX ORDER — DOCUSATE SODIUM 100 MG
100 CAPSULE ORAL THREE TIMES A DAY
Qty: 0 | Refills: 0 | Status: DISCONTINUED | OUTPATIENT
Start: 2018-03-13 | End: 2018-03-14

## 2018-03-13 RX ORDER — OXYCODONE HYDROCHLORIDE 5 MG/1
5 TABLET ORAL EVERY 4 HOURS
Qty: 0 | Refills: 0 | Status: DISCONTINUED | OUTPATIENT
Start: 2018-03-13 | End: 2018-03-14

## 2018-03-13 RX ORDER — OXYCODONE HYDROCHLORIDE 5 MG/1
10 TABLET ORAL EVERY 4 HOURS
Qty: 0 | Refills: 0 | Status: DISCONTINUED | OUTPATIENT
Start: 2018-03-13 | End: 2018-03-14

## 2018-03-13 RX ORDER — DIPHENHYDRAMINE HCL 50 MG
25 CAPSULE ORAL AT BEDTIME
Qty: 0 | Refills: 0 | Status: DISCONTINUED | OUTPATIENT
Start: 2018-03-13 | End: 2018-03-14

## 2018-03-13 RX ORDER — FOLIC ACID 0.8 MG
1 TABLET ORAL DAILY
Qty: 0 | Refills: 0 | Status: DISCONTINUED | OUTPATIENT
Start: 2018-03-13 | End: 2018-03-14

## 2018-03-13 RX ORDER — LISINOPRIL/HYDROCHLOROTHIAZIDE 10-12.5 MG
0 TABLET ORAL
Qty: 0 | Refills: 0 | COMMUNITY

## 2018-03-13 RX ORDER — ASCORBIC ACID 60 MG
500 TABLET,CHEWABLE ORAL
Qty: 0 | Refills: 0 | Status: DISCONTINUED | OUTPATIENT
Start: 2018-03-13 | End: 2018-03-14

## 2018-03-13 RX ADMIN — Medication 1 TABLET(S): at 12:40

## 2018-03-13 RX ADMIN — SODIUM CHLORIDE 75 MILLILITER(S): 9 INJECTION INTRAMUSCULAR; INTRAVENOUS; SUBCUTANEOUS at 04:02

## 2018-03-13 RX ADMIN — Medication 1 MILLIGRAM(S): at 12:39

## 2018-03-13 NOTE — CONSULT NOTE ADULT - ASSESSMENT
37y male with PMH significant for HTN   Admitted to NS on 1/07/18 post fall & found to have bimalleolar fracture of the ankle.   S/p ORIF on 3/12/18.   On 3/02/18 he noted that left ankle was unusually painful, followed by redness & swelling, furthermore, several days later incision dehisced slightly & pus came out  Started on Keflex on 3/14/18  Seen by orthopedic surgeon on 3/12/18 & now hospitalized for concerns of infected hardware  Redness of ankle & edema improved remarkably on Keflex - suspect a Keflex susceptible pathogen    Plan:   Agree with plans for I & D  ?? MARIA ELENA  Continue to hold additional Keflex/ antibiotics to allow for better yield of OR cx   Thanks

## 2018-03-13 NOTE — CONSULT NOTE ADULT - ATTENDING COMMENTS
The patient is medically stable, medically optimized and has no medical contraindication to surgery tomorrow as required. Exam time 70 minutes including > than 50 % for bedside discussion and counseling.

## 2018-03-13 NOTE — ED PROVIDER NOTE - OBJECTIVE STATEMENT
36 y/o male hx of HTN on Lisinopril and HCTZ, recent L ankle surgery on 1/12/18 p/w infection. Per patient, saw ortho today in clinic - told to come here for admission. Reports fever 2 weeks ago, resolved. Denies any pain. Patient still non weight bearing. Was started on abx Keflex - took total of 2 doses.  Surgeon: Dr. Mika Diaz 38 y/o male hx of HTN on Lisinopril and HCTZ, recent L ankle surgery on 1/12/18 p/w infection. Per patient, saw ortho today in clinic - told to come here for admission. Reports fever 2 weeks ago, resolved. Denies any pain. Patient still non weight bearing. Was started on abx Keflex - took total of 2 doses.  Surgeon: Dr. Mika Danielle

## 2018-03-13 NOTE — ED PROVIDER NOTE - ATTENDING CONTRIBUTION TO CARE
36 y/o male hx of HTN on Lisinopril and HCTZ, recent L ankle surgery on 1/12/18 p/w infection. Per patient, saw ortho today in clinic - told to come here for admission for wash out of his ankle, plain films, labs, abx and ortho admission. leg with redness, puncture post op sites with drainge as per patient but non noted in ed. vss.

## 2018-03-13 NOTE — ED PROVIDER NOTE - MEDICAL DECISION MAKING DETAILS
Zeke Ellington (Resident): 2 months ago, L ankle surgery now pw likely hardware infection - looks well, non toxic - mild erythema w/o discharge at L ankle - will check pre-op labs, d/w ortho, and admit for abx

## 2018-03-13 NOTE — H&P ADULT - NSHPPHYSICALEXAM_GEN_ALL_CORE
AVSS  Gen: NAD  LLE:  Sm open areas of drainage at proximal and distal aspects of wound with surrounding erythma, unable to express any pustulant drainage  SILT L2-S1  +EHL/FHL/TA/Gastroc  DP+  Soft compartments, - calf ttp AVSS  Gen: NAD  LLE:  Sm open areas of drainage at proximal and distal aspects of wound with surrounding erythma, unable to express any pustulant drainage, residual serofibrinous drainage  SILT L2-S1  +EHL/FHL/TA/Gastroc  DP+  Soft compartments, - calf ttp

## 2018-03-13 NOTE — CONSULT NOTE ADULT - SUBJECTIVE AND OBJECTIVE BOX
CONSULTING SERVICE:  Internal Medicine.    HISTORY OF PRESENT ILLNESS:  The patient is a 37-year-old male who was hospitalized on 2018 after accidental fall slipping and tripping on ice with left ankle fracture.  He underwent a successful ORIF 2 months ago and was feeling well until 2 weeks ago when he noted erythema of the left foot.  He delayed orthopedic followup examination and was seen and found to have cellulitis of the foot.  He was started on Keflex therapy.  However, continuous drainage occurred from that time forward.  At the present time, the patient has swelling and oozing of fluids from his left ankle with persistent erythema.  The patient was admitted for removal of hardware and wound irrigation to be performed by Dr. Nazario.    PAST MEDICAL HISTORY:  Includes hypertension.    MEDICATIONS:  Include 20 mg of lisinopril daily and 12.5 mg of hydrochlorothiazide daily.    ALLERGIES:  HE HAS NO KNOWN ALLERGIES.    PAST SURGICAL HISTORY:  Significant for appendectomy in , left ankle ORIF in 2018.    FAMILY HISTORY:  Positive for father with pancreatic carcinoma  at the age of 66 and mother and father both with hypertension.  The patient follows a regular diet.  His present weight is 165 pounds.      SOCIAL HISTORY:  He is a nonsmoker, nondrinker with no drug history.  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    REVIEW OF SYSTEMS:  Essentially normal.  He has no headaches or dizziness.  No changes in hearing or vision.  No sinusitis or dental disease.  He has no difficulty swallowing.  He has no shortness of breath, cough, congestion or wheezing.  He has no chest pain, palpitations or arrhythmias.  He has no abdominal pain, change in bowel habits or GI bleeding.  He has no dysuria, frequency or incontinence.  He has no rashes or skin disease.  He only has left ankle orthopedic discomfort with difficulty walking and persistent erythema and drainage.    PHYSICAL EXAMINATION:  VITAL SIGNS:  Blood pressure is 120/70, pulse of 68, and respirations 68.  HEAD AND NECK:  Normal.  CHEST:  Clear.  CARDIAC:  Normal.  ABDOMEN:  Soft with no masses, tenderness, guarding or rebound.  EXTREMITIES:  His left foot is opacified with an Ace bandage to limit movement.  He has distal erythema at the incision site.    LABORATORY DATA:  Laboratories performed show sedimentation of 28.  CBC, hemoglobin of 14.5, hematocrit 42.1, white count is 13.8, and platelet count is 505,000.  INR is 1.09.  PTT is 35.3.  C-reactive protein is elevated at 0.8.  Sodium is 139, potassium 4.1, chloride 99, CO2 24, BU6N is 15, creatinine 0.97, and blood sugars 86.  Liver function tests were normal.    DIAGNOSTIC DATA:  Chest x-ray performed shows clear lungs with no infiltrates.  EKG performed is normal with normal sinus rhythm.    IMPRESSION AND PLAN:  The impression at this time is the patient is 2 months status post open reduction and internal fixation left ankle with new onset of cellulitis and drainage from the operative site.  The patient does not have established septic arthritis at the present time and has recently completed a course of Keflex and is off antibiotics.  He is scheduled for OR removal of hardware, OR washout and OR cultures to determine the best mode of therapy from this point forward.  The patient is medically stable and has no medical contraindications to surgery as is required.                He will continue to have postoperative medical management to lessen the possibility of postoperative complications.      _______________________    DICT:	LUBA MARLEY MD  ([id number]) 2018 02:33 PM  TRANS:	[id number] 2018 02:40 PM  JOB:	[id number]

## 2018-03-13 NOTE — H&P ADULT - ASSESSMENT
37M with L ankle surgical site infection  -admit to ortho  -pain control  -NWB  -plan for OR tomorrow evening  -NPO and hold anticoag for OR  -F/u labs and preop testing  -will obtain med clearance in AM  -Discussed with Dr Loya, agrees with above plan 37M with L ankle surgical site infection  -admit to ortho  -pain control  -NWB  -plan for OR (I+D L ankle wound / infection, potential MARIA ELENA and wound VAC application)  -NPO and hold anticoag for OR  -F/u labs and preop testing  -will obtain med clearance in AM  -ID service consultation  -Discussed with Dr Loya, agrees with above plan

## 2018-03-13 NOTE — H&P ADULT - HISTORY OF PRESENT ILLNESS
37M sent to ED for evaluation of L ankle surgical incision leakage/erythema, s/p L ankle ORIF with Dr Loya 1/12/18. He first noticed the redness a few weeks ago, along with some clear oozing from the incision. He was seen by Dr Loya in the office today, and sent to ED I&D. Pt denies any numbnes/tingling to affected extremity. No fevers/chills. No other complaints. 37M sent to ED for evaluation of L ankle surgical incision leakage/erythema, s/p L ankle ORIF with Dr Loya 1/12/18. He first noticed the redness a few weeks ago, along with some clear oozing from the incision. He was seen by Dr Loya in the office today, and sent to ED I&D. Pt denies any numbnes/tingling to affected extremity. No fevers/chills. No other complaints.  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

## 2018-03-13 NOTE — CONSULT NOTE ADULT - SUBJECTIVE AND OBJECTIVE BOX
The patient was seen and examined today. He has a history of accidental fall 01/12/18 with ORIF of the left ankle. Doing well until 2 weeks ago with new onset of left foot erythema and patient was started on oral Keflex with improvement. Now with residual incisional erythema and drainage and patient has been advise removal of hardware, O.R. washout and cultures off antibiotics, at this time. Only comorbidity is controlled hypertension. Exam, lab, EKG and CXR are stable.  The patient is medically stable, medically optimized and has no medical contraindication to surgery later today or tomorrow as required. Exam time 70 minutes including > than 50 % for bedside discussion and counseling. Comprehensive consultation dictated # [id number].

## 2018-03-13 NOTE — ED PROVIDER NOTE - MUSCULOSKELETAL, MLM
L ankle w/ 2 erythematous surgical wounds on L lateral ankle no oozing, no tenderness, mild surrounding erythema. Dec ROM of L ankle w/ inversion and eversion. +2 DP pulses bilaterally

## 2018-03-13 NOTE — CONSULT NOTE ADULT - SUBJECTIVE AND OBJECTIVE BOX
HPI:   Patient is a 37y male with PMH significant for HTN who had slipped & traumatized his left ankle on 18. Presented to [facility] ER and found to have bimalleolar fracture of the ankle. Underwent ORIF on 3/12/18. Had been recovering well until about two weeks ago. On 3/02/18 he noted that left ankle was unusually painful, needed to take advil. Then the pain improved but ankle turned red. He was seen in his PMD's office on 3/14/18 after the incision dehisced itself in small area and pus came out. He was started on Keflex 1 gram TID. Seen by orthopedic surgeon on 3/12/18 who referred him back to [facility] for concerns of infected hardware     REVIEW OF SYSTEMS:  All other review of systems negative (Comprehensive ROS). He has bearable pain of ankle. Redness improved remarkably since started on Keflex. No fevers or chills.     PAST MEDICAL & SURGICAL HISTORY:  Gout: on PMDS note /stable as per patient  Fatty liver: benign follow-up by PMD  Hypertension: on medication  History of appendectomy      Allergies  No Known Allergies    Antimicrobials Day #  : off     Other Medications:  acetaminophen   Tablet 650 milliGRAM(s) Oral every 6 hours PRN  acetaminophen   Tablet. 650 milliGRAM(s) Oral every 6 hours PRN  ascorbic acid 500 milliGRAM(s) Oral two times a day  diphenhydrAMINE   Capsule 25 milliGRAM(s) Oral at bedtime PRN  docusate sodium 100 milliGRAM(s) Oral three times a day  folic acid 1 milliGRAM(s) Oral daily  multivitamin 1 Tablet(s) Oral daily  oxyCODONE    IR 10 milliGRAM(s) Oral every 4 hours PRN  oxyCODONE    IR 5 milliGRAM(s) Oral every 4 hours PRN  sodium chloride 0.9%. 1000 milliLiter(s) IV Continuous <Continuous>      FAMILY HISTORY:      SOCIAL HISTORY:  Smoking: No    ETOH: Occasional    Drug Use: No       T(F): 98 (18 @ 07:20), Max: 99.1 (18 @ 22:07)  HR: 73 (18 @ 07:20)  BP: 113/73 (18 @ 07:20)  RR: 18 (18 @ 07:20)  SpO2: 98% (18 @ 07:20)  Wt(kg): --    PHYSICAL EXAM:  General: alert, no acute distress  Eyes:  anicteric, no conjunctival injection, no discharge  Oropharynx: no lesions or injection 	  Neck: supple, without adenopathy  Lungs: clear to auscultation  Heart: regular rate and rhythm; no murmurs.  Abdomen: soft, nondistended, nontender, without mass or organomegaly  Skin: no lesions  Extremities: no clubbing or cyanosis                   left ankle - swollen with faint dark colored erythema, three areas of superficial ulcerations - once with some discharge on dressing. No pus can be expressed. Not warm or TTP   Neurologic: alert, oriented, moves all extremities    LAB RESULTS:                        14.5   13.8  )-----------( 505      ( 13 Mar 2018 02:08 )             42.1         139  |  99  |  15  ----------------------------<  86  4.1   |  24  |  0.97    Ca    10.5      13 Mar 2018 02:08    TPro  8.6<H>  /  Alb  4.3  /  TBili  0.4  /  DBili  x   /  AST  18  /  ALT  38  /  Alk Phos  97  03-13    LIVER FUNCTIONS - ( 13 Mar 2018 02:08 )  Alb: 4.3 g/dL / Pro: 8.6 g/dL / ALK PHOS: 97 U/L / ALT: 38 U/L RC / AST: 18 U/L / GGT: x           Urinalysis Basic - ( 13 Mar 2018 13:21 )    Color: Yellow / Appearance: Clear / S.014 / pH: x  Gluc: x / Ketone: Negative  / Bili: Negative / Urobili: Negative   Blood: x / Protein: Negative / Nitrite: Negative   Leuk Esterase: Negative / RBC: x / WBC x   Sq Epi: x / Non Sq Epi: x / Bacteria: x        MICROBIOLOGY:  RECENT CULTURES:      RADIOLOGY REVIEWED:

## 2018-03-14 ENCOUNTER — RESULT REVIEW (OUTPATIENT)
Age: 38
End: 2018-03-14

## 2018-03-14 DIAGNOSIS — I10 ESSENTIAL (PRIMARY) HYPERTENSION: ICD-10-CM

## 2018-03-14 DIAGNOSIS — T81.4XXA INFECTION FOLLOWING A PROCEDURE, INITIAL ENCOUNTER: ICD-10-CM

## 2018-03-14 LAB
ANION GAP SERPL CALC-SCNC: 13 MMOL/L — SIGNIFICANT CHANGE UP (ref 5–17)
APTT BLD: 33.6 SEC — SIGNIFICANT CHANGE UP (ref 27.5–37.4)
BLD GP AB SCN SERPL QL: NEGATIVE — SIGNIFICANT CHANGE UP
BUN SERPL-MCNC: 17 MG/DL — SIGNIFICANT CHANGE UP (ref 7–23)
CALCIUM SERPL-MCNC: 9.4 MG/DL — SIGNIFICANT CHANGE UP (ref 8.4–10.5)
CHLORIDE SERPL-SCNC: 103 MMOL/L — SIGNIFICANT CHANGE UP (ref 96–108)
CO2 SERPL-SCNC: 24 MMOL/L — SIGNIFICANT CHANGE UP (ref 22–31)
CREAT SERPL-MCNC: 0.94 MG/DL — SIGNIFICANT CHANGE UP (ref 0.5–1.3)
GLUCOSE SERPL-MCNC: 104 MG/DL — HIGH (ref 70–99)
HCT VFR BLD CALC: 39.2 % — SIGNIFICANT CHANGE UP (ref 39–50)
HGB BLD-MCNC: 13.4 G/DL — SIGNIFICANT CHANGE UP (ref 13–17)
INR BLD: 1.06 RATIO — SIGNIFICANT CHANGE UP (ref 0.88–1.16)
MCHC RBC-ENTMCNC: 30.5 PG — SIGNIFICANT CHANGE UP (ref 27–34)
MCHC RBC-ENTMCNC: 34.2 GM/DL — SIGNIFICANT CHANGE UP (ref 32–36)
MCV RBC AUTO: 89.1 FL — SIGNIFICANT CHANGE UP (ref 80–100)
PLATELET # BLD AUTO: 428 K/UL — HIGH (ref 150–400)
POTASSIUM SERPL-MCNC: 4.2 MMOL/L — SIGNIFICANT CHANGE UP (ref 3.5–5.3)
POTASSIUM SERPL-SCNC: 4.2 MMOL/L — SIGNIFICANT CHANGE UP (ref 3.5–5.3)
PROTHROM AB SERPL-ACNC: 11.5 SEC — SIGNIFICANT CHANGE UP (ref 9.8–12.7)
RBC # BLD: 4.4 M/UL — SIGNIFICANT CHANGE UP (ref 4.2–5.8)
RBC # FLD: 11.9 % — SIGNIFICANT CHANGE UP (ref 10.3–14.5)
RH IG SCN BLD-IMP: POSITIVE — SIGNIFICANT CHANGE UP
SODIUM SERPL-SCNC: 140 MMOL/L — SIGNIFICANT CHANGE UP (ref 135–145)
WBC # BLD: 10.3 K/UL — SIGNIFICANT CHANGE UP (ref 3.8–10.5)
WBC # FLD AUTO: 10.3 K/UL — SIGNIFICANT CHANGE UP (ref 3.8–10.5)

## 2018-03-14 PROCEDURE — 88311 DECALCIFY TISSUE: CPT | Mod: 26

## 2018-03-14 PROCEDURE — 20680 REMOVAL OF IMPLANT DEEP: CPT | Mod: 78

## 2018-03-14 PROCEDURE — 97605 NEG PRS WND THER DME<=50SQCM: CPT | Mod: 78

## 2018-03-14 PROCEDURE — 88305 TISSUE EXAM BY PATHOLOGIST: CPT | Mod: 26

## 2018-03-14 RX ORDER — ACETAMINOPHEN 500 MG
650 TABLET ORAL EVERY 6 HOURS
Qty: 0 | Refills: 0 | Status: DISCONTINUED | OUTPATIENT
Start: 2018-03-14 | End: 2018-03-19

## 2018-03-14 RX ORDER — HYDROMORPHONE HYDROCHLORIDE 2 MG/ML
1 INJECTION INTRAMUSCULAR; INTRAVENOUS; SUBCUTANEOUS EVERY 4 HOURS
Qty: 0 | Refills: 0 | Status: DISCONTINUED | OUTPATIENT
Start: 2018-03-14 | End: 2018-03-19

## 2018-03-14 RX ORDER — HYDROCHLOROTHIAZIDE 25 MG
12.5 TABLET ORAL ONCE
Qty: 0 | Refills: 0 | Status: COMPLETED | OUTPATIENT
Start: 2018-03-14 | End: 2018-03-14

## 2018-03-14 RX ORDER — OXYCODONE HYDROCHLORIDE 5 MG/1
5 TABLET ORAL
Qty: 0 | Refills: 0 | Status: DISCONTINUED | OUTPATIENT
Start: 2018-03-14 | End: 2018-03-19

## 2018-03-14 RX ORDER — CEFAZOLIN SODIUM 1 G
1000 VIAL (EA) INJECTION EVERY 8 HOURS
Qty: 0 | Refills: 0 | Status: DISCONTINUED | OUTPATIENT
Start: 2018-03-14 | End: 2018-03-19

## 2018-03-14 RX ORDER — FAMOTIDINE 10 MG/ML
20 INJECTION INTRAVENOUS DAILY
Qty: 0 | Refills: 0 | Status: DISCONTINUED | OUTPATIENT
Start: 2018-03-14 | End: 2018-03-19

## 2018-03-14 RX ORDER — OXYCODONE HYDROCHLORIDE 5 MG/1
10 TABLET ORAL
Qty: 0 | Refills: 0 | Status: DISCONTINUED | OUTPATIENT
Start: 2018-03-14 | End: 2018-03-19

## 2018-03-14 RX ORDER — HYDROCHLOROTHIAZIDE 25 MG
12.5 TABLET ORAL DAILY
Qty: 0 | Refills: 0 | Status: DISCONTINUED | OUTPATIENT
Start: 2018-03-15 | End: 2018-03-19

## 2018-03-14 RX ORDER — ONDANSETRON 8 MG/1
4 TABLET, FILM COATED ORAL ONCE
Qty: 0 | Refills: 0 | Status: DISCONTINUED | OUTPATIENT
Start: 2018-03-14 | End: 2018-03-14

## 2018-03-14 RX ORDER — LISINOPRIL 2.5 MG/1
20 TABLET ORAL DAILY
Qty: 0 | Refills: 0 | Status: DISCONTINUED | OUTPATIENT
Start: 2018-03-15 | End: 2018-03-19

## 2018-03-14 RX ORDER — ASPIRIN/CALCIUM CARB/MAGNESIUM 324 MG
325 TABLET ORAL DAILY
Qty: 0 | Refills: 0 | Status: DISCONTINUED | OUTPATIENT
Start: 2018-03-14 | End: 2018-03-19

## 2018-03-14 RX ORDER — ACETAMINOPHEN 500 MG
1000 TABLET ORAL ONCE
Qty: 0 | Refills: 0 | Status: DISCONTINUED | OUTPATIENT
Start: 2018-03-14 | End: 2018-03-19

## 2018-03-14 RX ORDER — SENNA PLUS 8.6 MG/1
2 TABLET ORAL AT BEDTIME
Qty: 0 | Refills: 0 | Status: DISCONTINUED | OUTPATIENT
Start: 2018-03-14 | End: 2018-03-19

## 2018-03-14 RX ORDER — SODIUM CHLORIDE 9 MG/ML
1000 INJECTION, SOLUTION INTRAVENOUS
Qty: 0 | Refills: 0 | Status: DISCONTINUED | OUTPATIENT
Start: 2018-03-14 | End: 2018-03-19

## 2018-03-14 RX ORDER — DOCUSATE SODIUM 100 MG
100 CAPSULE ORAL
Qty: 0 | Refills: 0 | Status: DISCONTINUED | OUTPATIENT
Start: 2018-03-14 | End: 2018-03-15

## 2018-03-14 RX ORDER — KETOROLAC TROMETHAMINE 30 MG/ML
15 SYRINGE (ML) INJECTION ONCE
Qty: 0 | Refills: 0 | Status: DISCONTINUED | OUTPATIENT
Start: 2018-03-14 | End: 2018-03-19

## 2018-03-14 RX ORDER — LISINOPRIL 2.5 MG/1
20 TABLET ORAL ONCE
Qty: 0 | Refills: 0 | Status: COMPLETED | OUTPATIENT
Start: 2018-03-14 | End: 2018-03-14

## 2018-03-14 RX ORDER — HYDROMORPHONE HYDROCHLORIDE 2 MG/ML
0.5 INJECTION INTRAMUSCULAR; INTRAVENOUS; SUBCUTANEOUS
Qty: 0 | Refills: 0 | Status: DISCONTINUED | OUTPATIENT
Start: 2018-03-14 | End: 2018-03-14

## 2018-03-14 RX ORDER — ASCORBIC ACID 60 MG
500 TABLET,CHEWABLE ORAL DAILY
Qty: 0 | Refills: 0 | Status: DISCONTINUED | OUTPATIENT
Start: 2018-03-14 | End: 2018-03-19

## 2018-03-14 RX ORDER — ZOLPIDEM TARTRATE 10 MG/1
5 TABLET ORAL AT BEDTIME
Qty: 0 | Refills: 0 | Status: DISCONTINUED | OUTPATIENT
Start: 2018-03-14 | End: 2018-03-19

## 2018-03-14 RX ORDER — ONDANSETRON 8 MG/1
4 TABLET, FILM COATED ORAL EVERY 6 HOURS
Qty: 0 | Refills: 0 | Status: DISCONTINUED | OUTPATIENT
Start: 2018-03-14 | End: 2018-03-19

## 2018-03-14 RX ADMIN — Medication 1 TABLET(S): at 13:26

## 2018-03-14 RX ADMIN — OXYCODONE HYDROCHLORIDE 5 MILLIGRAM(S): 5 TABLET ORAL at 22:35

## 2018-03-14 RX ADMIN — HYDROMORPHONE HYDROCHLORIDE 0.5 MILLIGRAM(S): 2 INJECTION INTRAMUSCULAR; INTRAVENOUS; SUBCUTANEOUS at 10:45

## 2018-03-14 RX ADMIN — Medication 12.5 MILLIGRAM(S): at 14:44

## 2018-03-14 RX ADMIN — SENNA PLUS 2 TABLET(S): 8.6 TABLET ORAL at 22:34

## 2018-03-14 RX ADMIN — LISINOPRIL 20 MILLIGRAM(S): 2.5 TABLET ORAL at 14:44

## 2018-03-14 RX ADMIN — SODIUM CHLORIDE 100 MILLILITER(S): 9 INJECTION, SOLUTION INTRAVENOUS at 13:24

## 2018-03-14 RX ADMIN — SODIUM CHLORIDE 100 MILLILITER(S): 9 INJECTION, SOLUTION INTRAVENOUS at 22:00

## 2018-03-14 RX ADMIN — Medication 100 MILLIGRAM(S): at 13:34

## 2018-03-14 RX ADMIN — OXYCODONE HYDROCHLORIDE 5 MILLIGRAM(S): 5 TABLET ORAL at 23:05

## 2018-03-14 RX ADMIN — FAMOTIDINE 20 MILLIGRAM(S): 10 INJECTION INTRAVENOUS at 13:34

## 2018-03-14 RX ADMIN — Medication 500 MILLIGRAM(S): at 13:28

## 2018-03-14 RX ADMIN — Medication 100 MILLIGRAM(S): at 22:35

## 2018-03-14 RX ADMIN — OXYCODONE HYDROCHLORIDE 5 MILLIGRAM(S): 5 TABLET ORAL at 11:36

## 2018-03-14 RX ADMIN — Medication 100 MILLIGRAM(S): at 18:30

## 2018-03-14 RX ADMIN — HYDROMORPHONE HYDROCHLORIDE 0.5 MILLIGRAM(S): 2 INJECTION INTRAMUSCULAR; INTRAVENOUS; SUBCUTANEOUS at 10:25

## 2018-03-14 RX ADMIN — OXYCODONE HYDROCHLORIDE 5 MILLIGRAM(S): 5 TABLET ORAL at 12:10

## 2018-03-14 RX ADMIN — Medication 325 MILLIGRAM(S): at 13:33

## 2018-03-14 NOTE — PROGRESS NOTE ADULT - ASSESSMENT
Patient is post op Day 0:     Pre-Op Diagnosis:  Infection of joint of ankle  03/14/2018    Jeffrey Ghotra.     Post-Op Dx:  Infection of joint of ankle  03/14/2018    Jeffrey Ghotra.    Procedure:    Procedure:  Incision and drainage of ankle  03/14/2018  I&D L ankle  Active  [id number]  Removal of hardware  03/14/2018  Incision and Drainage  Removal of Hardware  VAC placement L ankle

## 2018-03-14 NOTE — BRIEF OPERATIVE NOTE - PROCEDURE
<<-----Click on this checkbox to enter Procedure Incision and drainage of ankle  03/14/2018  I&D L ankle  Active  GWILNER  Removal of hardware  03/14/2018  Incision and Drainage  Removal of Hardware  VAC placement L ankle  Active  GWILNER

## 2018-03-14 NOTE — PHYSICAL THERAPY INITIAL EVALUATION ADULT - PERTINENT HX OF CURRENT PROBLEM, REHAB EVAL
37M sent to ED for evaluation of L ankle surgical incision leakage/erythema, s/p L ankle ORIF with Dr Loya 1/12/18. He first noticed the redness a few weeks ago, along with some clear oozing from the incision. He was seen by Dr Loya in the office today, and sent to ED I&D. Pt denies any numbnes/tingling to affected extremity. No fevers/chills. No other complaints.  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 s/p L ankle I&D 3/13/18

## 2018-03-14 NOTE — CHART NOTE - NSCHARTNOTEFT_GEN_A_CORE
Ortho POC    Resting without complaints.  No Chest Pain, SOB, N/V.    T(C): 36.6 (03-14-18 @ 11:14), Max: 36.9 (03-13-18 @ 16:12)  HR: 79 (03-14-18 @ 11:14) (68 - 88)  BP: 145/89 (03-14-18 @ 11:14) (113/76 - 165/70)  RR: 18 (03-14-18 @ 11:14) (16 - 18)  SpO2: 96% (03-14-18 @ 11:14) (96% - 100%)      Exam:  Alert and [place], No Acute Distress  Card: +S1/S2, RRR  Pulm: CTAB  Abdomen soft / benign  Thomas  [n ]   EXT       LLE:           Splint in tact            Dsg C/D            VAC in place             compartments soft             Toes: pink / warm / sensate / mobile                       A/P: S/p Removal of hardware  Incision and drainage of ankle  VAC placement      -PT: NWB LLE  -Chk AM Labs  -DVT PPx Ecotrin QD  -Pain Control PO/IV Pain Rx  - Cont Anceg Q 8  --F/U OR Cx  --ID F/U  --Cont VAC:  VAC change 3/16      ***See Above  Jeffrey CALLE  Orthopedics  B: [phone]  S: [phone]

## 2018-03-14 NOTE — PROGRESS NOTE ADULT - ASSESSMENT
37y male with PMH significant for HTN   Admitted to NS on 1/07/18 post fall & found to have bimalleolar fracture of the ankle.   S/p ORIF on 3/12/18.   On 3/02/18 he noted that left ankle was unusually painful, followed by redness & swelling, furthermore, several days later incision dehisced slightly & pus came out  Started on Keflex on 3/14/18  Seen by orthopedic surgeon on 3/12/18 & hospitalized for concerns of infected hardware  Redness of ankle & edema improved remarkably on Keflex - suspect a Keflex susceptible pathogen  S/p I & D of left ankle & MARIA ELENA on 3/14/18  OR findings reviewed - no pus noted - 3 sinus tracts noted    Plan:   Agree with start of IV Cefazolin  Follow OR cx

## 2018-03-14 NOTE — PROGRESS NOTE ADULT - SUBJECTIVE AND OBJECTIVE BOX
CC: f/u for Left ankle infected ORIF     Patient reports feeling okay     REVIEW OF SYSTEMS:  All other review of systems negative (Comprehensive ROS)    Antimicrobials Day #  : 1  ceFAZolin   IVPB 1000 milliGRAM(s) IV Intermittent every 8 hours    Other Medications Reviewed    T(F): 98 (18 @ 14:08), Max: 98.5 (18 @ 16:12)  HR: 91 (18 @ 14:42)  BP: 134/77 (18 @ 14:42)  RR: 18 (18 @ 14:08)  SpO2: 96% (18 @ 14:08)  Wt(kg): --    PHYSICAL EXAM:  General: alert, no acute distress  Eyes:  anicteric, no conjunctival injection, no discharge  Oropharynx: no lesions or injection 	  Neck: supple, without adenopathy  Lungs: clear to auscultation  Heart: regular rate and rhythm; no murmur, rubs or gallops  Abdomen: soft, nondistended, nontender, without mass or organomegaly  Skin: no lesions  Extremities: Left foot dressed   Neurologic: alert, oriented, moves all extremities    LAB RESULTS:                        13.4   10.3  )-----------( 428      ( 14 Mar 2018 05:03 )             39.2     03-14    140  |  103  |  17  ----------------------------<  104<H>  4.2   |  24  |  0.94    Ca    9.4      14 Mar 2018 05:03    TPro  8.6<H>  /  Alb  4.3  /  TBili  0.4  /  DBili  x   /  AST  18  /  ALT  38  /  AlkPhos  97  03-13    LIVER FUNCTIONS - ( 13 Mar 2018 02:08 )  Alb: 4.3 g/dL / Pro: 8.6 g/dL / ALK PHOS: 97 U/L / ALT: 38 U/L RC / AST: 18 U/L / GGT: x           Urinalysis Basic - ( 13 Mar 2018 13:21 )    Color: Yellow / Appearance: Clear / S.014 / pH: x  Gluc: x / Ketone: Negative  / Bili: Negative / Urobili: Negative   Blood: x / Protein: Negative / Nitrite: Negative   Leuk Esterase: Negative / RBC: x / WBC x   Sq Epi: x / Non Sq Epi: x / Bacteria: x      MICROBIOLOGY:  RECENT CULTURES:   @ 06:27 .Blood Blood     No growth to date.      RADIOLOGY REVIEWED:

## 2018-03-14 NOTE — PROGRESS NOTE ADULT - SUBJECTIVE AND OBJECTIVE BOX
Patient doing well. NPO.    Vital Signs Last 24 Hrs  T(C): 36.7 (14 Mar 2018 04:43), Max: 37.3 (13 Mar 2018 06:54)  T(F): 98 (14 Mar 2018 04:43), Max: 99.1 (13 Mar 2018 06:54)  HR: 68 (14 Mar 2018 04:43) (68 - 85)  BP: 115/75 (14 Mar 2018 04:43) (111/71 - 133/77)  BP(mean): --  RR: 18 (14 Mar 2018 04:43) (18 - 18)  SpO2: 96% (14 Mar 2018 04:43) (96% - 99%)    NAD, alert  LLE: dressing CDI. +DF/PF moving toes. [facility]     A/p: 37M with infected L ankle ORIF  -OR today for I&D  -NPO  --OR at 730 Patient doing well. NPO.    Vital Signs Last 24 Hrs  T(C): 36.7 (14 Mar 2018 04:43), Max: 37.3 (13 Mar 2018 06:54)  T(F): 98 (14 Mar 2018 04:43), Max: 99.1 (13 Mar 2018 06:54)  HR: 68 (14 Mar 2018 04:43) (68 - 85)  BP: 115/75 (14 Mar 2018 04:43) (111/71 - 133/77)  BP(mean): --  RR: 18 (14 Mar 2018 04:43) (18 - 18)  SpO2: 96% (14 Mar 2018 04:43) (96% - 99%)    NAD, alert  LLE: dressing CDI. +DF/PF moving toes. [facility]IL[facility]     A/p: 37M with wound infection, s/p L ankle ORIF  -OR today for I&D  -NPO  --OR at 730

## 2018-03-14 NOTE — BRIEF OPERATIVE NOTE - OPERATION/FINDINGS
Sero-sanginous drainage noted from incision   No gross purulence   w/ 3 sinus tracks noted and irrigated thoroughly

## 2018-03-14 NOTE — PHYSICAL THERAPY INITIAL EVALUATION ADULT - ACTIVE RANGE OF MOTION EXAMINATION, REHAB EVAL
left ankle NT in cast, (+)VAC/bilateral  lower extremity Active ROM was WFL (within functional limits)/bilateral upper extremity Active ROM was WFL (within functional limits)

## 2018-03-14 NOTE — PHYSICAL THERAPY INITIAL EVALUATION ADULT - MODALITIES TREATMENT COMMENTS
PT WC eval rec'ed. Pt rec'ed supine in bed, + splint to LLE, anabella PT WC Eval & VAC change w/o adverse reaction. Wound rec'ed C/D/I, no odor, purulence, erythema noted. Wound measuring 2cm x 0.4cm x 1cm. Cleansed w/ NS, cavilon to periwound, black granufoam, adaptic touch as contact layer, good seal 125mmHg, continuous. Splint w/ ace wrap re-applied. Pt left supine in bed, NAD, call bell in reach, purposeful proactive rounding took place, RN/Ngoc aware. [employer] VAC paperwork filled out.

## 2018-03-14 NOTE — PROGRESS NOTE ADULT - SUBJECTIVE AND OBJECTIVE BOX
Patient is a 37y old  Male who presents with a chief complaint of Infection of L foot surgical site (13 Mar 2018 17:15)      HPI:  Patient seen post op in PACU  Tolerated surgery well.  Pain controlled .  Needs incentive spirometry .   Pre-Op Diagnosis:  Infection of joint of ankle  03/14/2018    Jeffrey Ghotra.     Post-Op Dx:  Infection of joint of ankle  03/14/2018    Jeffrey Ghotra.    Procedure:    Procedure:  Incision and drainage of ankle  03/14/2018  I&D L ankle  Active  JOSUÉ  Removal of hardware  03/14/2018  Incision and Drainage  Removal of Hardware  VAC placement L ankle    MEDICATIONS  (STANDING):  acetaminophen  IVPB. 1000 milliGRAM(s) IV Intermittent once  ascorbic acid 500 milliGRAM(s) Oral daily  aspirin enteric coated 325 milliGRAM(s) Oral daily  ceFAZolin   IVPB 1000 milliGRAM(s) IV Intermittent every 8 hours  docusate sodium 100 milliGRAM(s) Oral two times a day  famotidine    Tablet 20 milliGRAM(s) Oral daily  ketorolac   Injectable 15 milliGRAM(s) IV Push once  lactated ringers. 1000 milliLiter(s) (100 mL/Hr) IV Continuous <Continuous>  multivitamin 1 Tablet(s) Oral daily  senna 2 Tablet(s) Oral at bedtime    MEDICATIONS  (PRN):  acetaminophen   Tablet 650 milliGRAM(s) Oral every 6 hours PRN For Temp greater than 38 C (100.4 F)  HYDROmorphone  Injectable 1 milliGRAM(s) IV Push every 4 hours PRN breakthrough  ondansetron Injectable 4 milliGRAM(s) IV Push every 6 hours PRN Nausea and/or Vomiting  oxyCODONE    IR 5 milliGRAM(s) Oral every 3 hours PRN Moderate Pain (4 - 6)  oxyCODONE    IR 10 milliGRAM(s) Oral every 3 hours PRN Severe Pain (7 - 10)  zolpidem 5 milliGRAM(s) Oral at bedtime PRN Insomnia      Allergies    No Known Allergies    Intolerances      VITALS:   T(C): 36.7 (03-14-18 @ 14:08), Max: 36.9 (03-13-18 @ 16:12)  HR: 91 (03-14-18 @ 14:42) (68 - 96)  BP: 134/77 (03-14-18 @ 14:42) (115/68 - 165/70)  RR: 18 (03-14-18 @ 14:08) (16 - 18)  SpO2: 96% (03-14-18 @ 14:08) (95% - 100%)  Wt(kg): --    03-13 @ 07:01  -  03-14 @ 07:00  --------------------------------------------------------  IN: 1005 mL / OUT: 0 mL / NET: 1005 mL    03-14 @ 07:01  -  03-14 @ 15:47  --------------------------------------------------------  IN: 100 mL / OUT: 0 mL / NET: 100 mL        PHYSICAL EXAM:  GENERAL: NAD, well nourished and conversant  HEAD:  Atraumatic  EYES: EOM, PERRLA, conjunctiva pink and sclera white  ENT: No tonsillar erythema, exudates, or enlargement, moist mucous membranes, good dentition, no lesions  NECK: Supple, No JVD, normal thyroid, carotids with normal upstrokes and no bruits  CHEST/LUNG: Clear to auscultation bilaterally, No rales, rhonchi, wheezing, or rubs  HEART: Regular rate and rhythm, No murmurs, rubs, or gallops  ABDOMEN: Soft, nondistended, no masses, guarding, tenderness or rebound, bowel sounds present  EXTREMITIES:  2+ Peripheral Pulses, No clubbing, cyanosis, or edema.  s/p left foot MARIA ELENA and I&D  LYMPH: No lymphadenopathy noted  SKIN: No rashes or lesions  NERVOUS SYSTEM:  Alert & Oriented X3, normal cognitive function. Motor Strength 5/5 right upper and right lower.  5/5 left upper and left lower extremities, DTRs 2+ intact and symmetric    LABS:                          13.4   10.3  )-----------( 428      ( 14 Mar 2018 05:03 )             39.2     03-14    140  |  103  |  17  ----------------------------<  104<H>  4.2   |  24  |  0.94  03-13    139  |  99  |  15  ----------------------------<  86  4.1   |  24  |  0.97    Ca    9.4      14 Mar 2018 05:03  Ca    10.5      13 Mar 2018 02:08    TPro  8.6<H>  /  Alb  4.3  /  TBili  0.4  /  DBili  x   /  AST  18  /  ALT  38  /  AlkPhos  97  03-13    CAPILLARY BLOOD GLUCOSE          RADIOLOGY & ADDITIONAL TESTS:      Consultant(s):    Care Discussed with Consultants/Other Providers [ ] YES  [ ] NO

## 2018-03-14 NOTE — PHYSICAL THERAPY INITIAL EVALUATION ADULT - DISCHARGE DISPOSITION, PT EVAL
family available to assist/outpatient PT family available to assist; Negative PRessure WOund therapy/outpatient PT

## 2018-03-14 NOTE — PHYSICAL THERAPY INITIAL EVALUATION ADULT - ADDITIONAL COMMENTS
pt lives with wife in private house, (+)stairs to bedroom. pt independent with mobility, ambulating with B axillary crutches.

## 2018-03-15 ENCOUNTER — TRANSCRIPTION ENCOUNTER (OUTPATIENT)
Age: 38
End: 2018-03-15

## 2018-03-15 DIAGNOSIS — M10.9 GOUT, UNSPECIFIED: ICD-10-CM

## 2018-03-15 LAB
ANION GAP SERPL CALC-SCNC: 14 MMOL/L — SIGNIFICANT CHANGE UP (ref 5–17)
BASOPHILS # BLD AUTO: 0 K/UL — SIGNIFICANT CHANGE UP (ref 0–0.2)
BASOPHILS NFR BLD AUTO: 0.3 % — SIGNIFICANT CHANGE UP (ref 0–2)
BUN SERPL-MCNC: 11 MG/DL — SIGNIFICANT CHANGE UP (ref 7–23)
CALCIUM SERPL-MCNC: 9.5 MG/DL — SIGNIFICANT CHANGE UP (ref 8.4–10.5)
CHLORIDE SERPL-SCNC: 101 MMOL/L — SIGNIFICANT CHANGE UP (ref 96–108)
CO2 SERPL-SCNC: 24 MMOL/L — SIGNIFICANT CHANGE UP (ref 22–31)
CREAT SERPL-MCNC: 0.82 MG/DL — SIGNIFICANT CHANGE UP (ref 0.5–1.3)
EOSINOPHIL # BLD AUTO: 0 K/UL — SIGNIFICANT CHANGE UP (ref 0–0.5)
EOSINOPHIL NFR BLD AUTO: 0.3 % — SIGNIFICANT CHANGE UP (ref 0–6)
GLUCOSE SERPL-MCNC: 96 MG/DL — SIGNIFICANT CHANGE UP (ref 70–99)
HCT VFR BLD CALC: 36.1 % — LOW (ref 39–50)
HGB BLD-MCNC: 12.4 G/DL — LOW (ref 13–17)
LYMPHOCYTES # BLD AUTO: 26.6 % — SIGNIFICANT CHANGE UP (ref 13–44)
LYMPHOCYTES # BLD AUTO: 4.5 K/UL — HIGH (ref 1–3.3)
MCHC RBC-ENTMCNC: 30.5 PG — SIGNIFICANT CHANGE UP (ref 27–34)
MCHC RBC-ENTMCNC: 34.3 GM/DL — SIGNIFICANT CHANGE UP (ref 32–36)
MCV RBC AUTO: 88.9 FL — SIGNIFICANT CHANGE UP (ref 80–100)
MONOCYTES # BLD AUTO: 0.7 K/UL — SIGNIFICANT CHANGE UP (ref 0–0.9)
MONOCYTES NFR BLD AUTO: 4.3 % — SIGNIFICANT CHANGE UP (ref 2–14)
NEUTROPHILS # BLD AUTO: 11.5 K/UL — HIGH (ref 1.8–7.4)
NEUTROPHILS NFR BLD AUTO: 68.6 % — SIGNIFICANT CHANGE UP (ref 43–77)
PLATELET # BLD AUTO: 409 K/UL — HIGH (ref 150–400)
POTASSIUM SERPL-MCNC: 3.8 MMOL/L — SIGNIFICANT CHANGE UP (ref 3.5–5.3)
POTASSIUM SERPL-SCNC: 3.8 MMOL/L — SIGNIFICANT CHANGE UP (ref 3.5–5.3)
RBC # BLD: 4.06 M/UL — LOW (ref 4.2–5.8)
RBC # FLD: 12.1 % — SIGNIFICANT CHANGE UP (ref 10.3–14.5)
SODIUM SERPL-SCNC: 139 MMOL/L — SIGNIFICANT CHANGE UP (ref 135–145)
WBC # BLD: 16.8 K/UL — HIGH (ref 3.8–10.5)
WBC # FLD AUTO: 16.8 K/UL — HIGH (ref 3.8–10.5)

## 2018-03-15 RX ORDER — SENNA PLUS 8.6 MG/1
2 TABLET ORAL DAILY
Qty: 0 | Refills: 0 | Status: DISCONTINUED | OUTPATIENT
Start: 2018-03-15 | End: 2018-03-15

## 2018-03-15 RX ORDER — DOCUSATE SODIUM 100 MG
100 CAPSULE ORAL THREE TIMES A DAY
Qty: 0 | Refills: 0 | Status: DISCONTINUED | OUTPATIENT
Start: 2018-03-15 | End: 2018-03-19

## 2018-03-15 RX ADMIN — Medication 325 MILLIGRAM(S): at 11:55

## 2018-03-15 RX ADMIN — Medication 1 TABLET(S): at 11:51

## 2018-03-15 RX ADMIN — Medication 100 MILLIGRAM(S): at 21:31

## 2018-03-15 RX ADMIN — OXYCODONE HYDROCHLORIDE 5 MILLIGRAM(S): 5 TABLET ORAL at 06:07

## 2018-03-15 RX ADMIN — OXYCODONE HYDROCHLORIDE 5 MILLIGRAM(S): 5 TABLET ORAL at 14:20

## 2018-03-15 RX ADMIN — Medication 100 MILLIGRAM(S): at 05:37

## 2018-03-15 RX ADMIN — OXYCODONE HYDROCHLORIDE 5 MILLIGRAM(S): 5 TABLET ORAL at 05:37

## 2018-03-15 RX ADMIN — Medication 500 MILLIGRAM(S): at 11:50

## 2018-03-15 RX ADMIN — Medication 100 MILLIGRAM(S): at 17:58

## 2018-03-15 RX ADMIN — OXYCODONE HYDROCHLORIDE 5 MILLIGRAM(S): 5 TABLET ORAL at 13:32

## 2018-03-15 RX ADMIN — OXYCODONE HYDROCHLORIDE 5 MILLIGRAM(S): 5 TABLET ORAL at 22:01

## 2018-03-15 RX ADMIN — SENNA PLUS 2 TABLET(S): 8.6 TABLET ORAL at 17:58

## 2018-03-15 RX ADMIN — OXYCODONE HYDROCHLORIDE 5 MILLIGRAM(S): 5 TABLET ORAL at 21:31

## 2018-03-15 RX ADMIN — Medication 100 MILLIGRAM(S): at 13:33

## 2018-03-15 NOTE — PROGRESS NOTE ADULT - SUBJECTIVE AND OBJECTIVE BOX
38 yo male s/p removal of hardware. hx of an ORIF of the left ankle complicated by a wound infection. seen now s/p removal of hard wear      MEDICATIONS  (STANDING):  acetaminophen  IVPB. 1000 milliGRAM(s) IV Intermittent once  ascorbic acid 500 milliGRAM(s) Oral daily  aspirin enteric coated 325 milliGRAM(s) Oral daily  ceFAZolin   IVPB 1000 milliGRAM(s) IV Intermittent every 8 hours  docusate sodium 100 milliGRAM(s) Oral three times a day  famotidine    Tablet 20 milliGRAM(s) Oral daily  hydrochlorothiazide 12.5 milliGRAM(s) Oral daily  ketorolac   Injectable 15 milliGRAM(s) IV Push once  lactated ringers. 1000 milliLiter(s) (100 mL/Hr) IV Continuous <Continuous>  lisinopril 20 milliGRAM(s) Oral daily  multivitamin 1 Tablet(s) Oral daily  senna 2 Tablet(s) Oral at bedtime    MEDICATIONS  (PRN):  acetaminophen   Tablet 650 milliGRAM(s) Oral every 6 hours PRN For Temp greater than 38 C (100.4 F)  HYDROmorphone  Injectable 1 milliGRAM(s) IV Push every 4 hours PRN breakthrough  ondansetron Injectable 4 milliGRAM(s) IV Push every 6 hours PRN Nausea and/or Vomiting  oxyCODONE    IR 5 milliGRAM(s) Oral every 3 hours PRN Moderate Pain (4 - 6)  oxyCODONE    IR 10 milliGRAM(s) Oral every 3 hours PRN Severe Pain (7 - 10)  zolpidem 5 milliGRAM(s) Oral at bedtime PRN Insomnia      PHYSICAL EXAM:  GENERAL: NAD, well nourished and conversant  HEAD:  Atraumatic  EYES: EOM, PERRLA, conjunctiva pink and sclera white  ENT: No tonsillar erythema, exudates, or enlargement, moist mucous membranes, good dentition, no lesions  NECK: Supple, No JVD, normal thyroid, carotids with normal upstrokes and no bruits  CHEST/LUNG: Clear to auscultation bilaterally, No rales, rhonchi, wheezing, or rubs  HEART: Regular rate and rhythm, No murmurs, rubs, or gallops  ABDOMEN: Soft, nondistended, no masses, guarding, tenderness or rebound, bowel sounds present  EXTREMITIES:  2+ Peripheral Pulses, No clubbing, cyanosis, or edema.  s/p left foot MARIA ELENA and I&D  LYMPH: No lymphadenopathy noted  SKIN: No rashes or lesions  NERVOUS SYSTEM:  Alert & Oriented X3, normal cognitive function. Motor Strength 5/5 right upper and right lower.  5/5 left upper and left lower extremities, DTRs 2+ intact and symmetric      VITALS:   T(C): 36.7 (03-15-18 @ 22:06), Max: 37.1 (03-15-18 @ 17:23)  HR: 83 (03-15-18 @ 22:06) (67 - 83)  BP: 124/76 (03-15-18 @ 22:06) (112/58 - 135/80)  RR: 18 (03-15-18 @ 22:06) (16 - 18)  SpO2: 96% (03-15-18 @ 22:06) (93% - 98%)  Wt(kg): --        LABS:        CBC Full  -  ( 15 Mar 2018 06:36 )  WBC Count : 16.8 K/uL  Hemoglobin : 12.4 g/dL  Hematocrit : 36.1 %  Platelet Count - Automated : 409 K/uL  Mean Cell Volume : 88.9 fl  Mean Cell Hemoglobin : 30.5 pg  Mean Cell Hemoglobin Concentration : 34.3 gm/dL  Auto Neutrophil # : 11.5 K/uL  Auto Lymphocyte # : 4.5 K/uL  Auto Monocyte # : 0.7 K/uL  Auto Eosinophil # : 0.0 K/uL  Auto Basophil # : 0.0 K/uL  Auto Neutrophil % : 68.6 %  Auto Lymphocyte % : 26.6 %  Auto Monocyte % : 4.3 %  Auto Eosinophil % : 0.3 %  Auto Basophil % : 0.3 %    03-15    139  |  101  |  11  ----------------------------<  96  3.8   |  24  |  0.82    Ca    9.5      15 Mar 2018 06:36        PT/INR - ( 14 Mar 2018 05:03 )   PT: 11.5 sec;   INR: 1.06 ratio         PTT - ( 14 Mar 2018 05:03 )  PTT:33.6 sec    CAPILLARY BLOOD GLUCOSE          RADIOLOGY & ADDITIONAL TESTS:

## 2018-03-15 NOTE — PROGRESS NOTE ADULT - ASSESSMENT
37y male with PMH significant for HTN   Admitted to NS on 1/07/18 post fall & found to have bimalleolar fracture of the ankle.   S/p ORIF on 3/12/18.   On 3/02/18 he noted that left ankle was unusually painful, followed by redness & swelling, furthermore, several days later incision dehisced slightly & pus came out  Started on Keflex on 3/14/18  Seen by orthopedic surgeon on 3/12/18 & hospitalized for concerns of infected hardware  Redness of ankle & edema improved remarkably on Keflex - suspect a Keflex susceptible pathogen  S/p I & D of left ankle & MARIA ELENA on 3/14/18  OR findings reviewed - no pus noted - 3 sinus tracts noted  Cx now with Staph aureus     Plan:   Continue IV Cefazolin  Follow further susceptibility data on staph

## 2018-03-15 NOTE — PROGRESS NOTE ADULT - ASSESSMENT
36 YO M s/p L ankle I&D/MARIA ELENA and vac placement POD1  Pain control  NWB LLE in posterior slab  PT  Vac changes (MWF)  FU BCx  ID recs  Further recs to follow 36 YO M s/p L ankle I&D/MARIA ELENA and vac placement POD1  Pain control  NWB LLE in posterior slab  PT  Vac changes (MWF)  FU wound C+S, BCx  ID recs  Further recs to follow

## 2018-03-15 NOTE — PROGRESS NOTE ADULT - SUBJECTIVE AND OBJECTIVE BOX
CC: f/u for left ankle ORIF infection     Patient reports feeling well     REVIEW OF SYSTEMS:  All other review of systems negative (Comprehensive ROS)    Antimicrobials Day #  : 2  ceFAZolin   IVPB 1000 milliGRAM(s) IV Intermittent every 8 hours    Other Medications Reviewed    T(F): 98.5 (03-15-18 @ 13:46), Max: 98.8 (03-14-18 @ 17:29)  HR: 67 (03-15-18 @ 13:46)  BP: 127/84 (03-15-18 @ 13:46)  RR: 16 (03-15-18 @ 13:46)  SpO2: 93% (03-15-18 @ 13:46)  Wt(kg): --    PHYSICAL EXAM:  General: alert, no acute distress  Eyes:  anicteric, no conjunctival injection, no discharge  Oropharynx: no lesions or injection 	  Neck: supple, without adenopathy  Lungs: clear to auscultation  Heart: regular rate and rhythm; no murmur  Abdomen: soft, nondistended, nontender, without mass or organomegaly  Skin: no lesions  Extremities: no clubbing, cyanosis, or edema. Left foot dressed  Neurologic: alert, oriented, moves all extremities    LAB RESULTS:                        12.4   16.8  )-----------( 409      ( 15 Mar 2018 06:36 )             36.1     03-15    139  |  101  |  11  ----------------------------<  96  3.8   |  24  |  0.82    Ca    9.5      15 Mar 2018 06:36          MICROBIOLOGY:  RECENT CULTURES:  03-14 @ 14:21 .Surgical Swab 2. wound abscess left ankle     Few Staphylococcus aureus      03-14 @ 14:05 .Surgical Swab 3. wound abscess left ankle     No growth      03-14 @ 13:55 .Surgical Swab 1. wound abscess left ankle     Rare Gram Positive Cocci      03-13 @ 06:27 .Blood Blood     No growth to date.                RADIOLOGY REVIEWED:

## 2018-03-15 NOTE — PROGRESS NOTE ADULT - SUBJECTIVE AND OBJECTIVE BOX
Pt S&E. Pain controlled. No acute events overnight    Vital Signs Last 24 Hrs  T(C): 36.8 (15 Mar 2018 05:03), Max: 37.1 (14 Mar 2018 17:29)  T(F): 98.3 (15 Mar 2018 05:03), Max: 98.8 (14 Mar 2018 17:29)  HR: 69 (15 Mar 2018 05:03) (69 - 96)  BP: 113/67 (15 Mar 2018 05:03) (112/58 - 165/70)  BP(mean): 114 (14 Mar 2018 10:30) (107 - 114)  RR: 17 (15 Mar 2018 05:03) (16 - 18)  SpO2: 96% (15 Mar 2018 05:03) (95% - 100%)    Gen: NAD  LLE:  Posterior splint c/d/i, +wound vac  SILT L2-S1  Able to wiggle toes  Brisk cap refill  Soft compartments, - calf ttp

## 2018-03-16 ENCOUNTER — TRANSCRIPTION ENCOUNTER (OUTPATIENT)
Age: 38
End: 2018-03-16

## 2018-03-16 LAB
-  AMPICILLIN/SULBACTAM: SIGNIFICANT CHANGE UP
-  CEFAZOLIN: SIGNIFICANT CHANGE UP
-  CIPROFLOXACIN: SIGNIFICANT CHANGE UP
-  CLINDAMYCIN: SIGNIFICANT CHANGE UP
-  ERYTHROMYCIN: SIGNIFICANT CHANGE UP
-  GENTAMICIN: SIGNIFICANT CHANGE UP
-  LEVOFLOXACIN: SIGNIFICANT CHANGE UP
-  MOXIFLOXACIN(AEROBIC): SIGNIFICANT CHANGE UP
-  OXACILLIN: SIGNIFICANT CHANGE UP
-  PENICILLIN: SIGNIFICANT CHANGE UP
-  RIFAMPIN: SIGNIFICANT CHANGE UP
-  TETRACYCLINE: SIGNIFICANT CHANGE UP
-  TRIMETHOPRIM/SULFAMETHOXAZOLE: SIGNIFICANT CHANGE UP
-  VANCOMYCIN: SIGNIFICANT CHANGE UP
METHOD TYPE: SIGNIFICANT CHANGE UP

## 2018-03-16 RX ORDER — SENNA PLUS 8.6 MG/1
2 TABLET ORAL
Qty: 14 | Refills: 0 | OUTPATIENT
Start: 2018-03-16 | End: 2018-03-22

## 2018-03-16 RX ORDER — ASPIRIN/CALCIUM CARB/MAGNESIUM 324 MG
1 TABLET ORAL
Qty: 42 | Refills: 0 | OUTPATIENT
Start: 2018-03-16 | End: 2018-04-26

## 2018-03-16 RX ORDER — DOCUSATE SODIUM 100 MG
1 CAPSULE ORAL
Qty: 21 | Refills: 0 | OUTPATIENT
Start: 2018-03-16 | End: 2018-03-22

## 2018-03-16 RX ADMIN — Medication 1 TABLET(S): at 13:25

## 2018-03-16 RX ADMIN — Medication 100 MILLIGRAM(S): at 22:01

## 2018-03-16 RX ADMIN — Medication 100 MILLIGRAM(S): at 22:02

## 2018-03-16 RX ADMIN — Medication 12.5 MILLIGRAM(S): at 05:41

## 2018-03-16 RX ADMIN — Medication 100 MILLIGRAM(S): at 14:12

## 2018-03-16 RX ADMIN — OXYCODONE HYDROCHLORIDE 5 MILLIGRAM(S): 5 TABLET ORAL at 13:25

## 2018-03-16 RX ADMIN — Medication 325 MILLIGRAM(S): at 13:25

## 2018-03-16 RX ADMIN — Medication 100 MILLIGRAM(S): at 05:41

## 2018-03-16 RX ADMIN — SENNA PLUS 2 TABLET(S): 8.6 TABLET ORAL at 22:01

## 2018-03-16 RX ADMIN — OXYCODONE HYDROCHLORIDE 5 MILLIGRAM(S): 5 TABLET ORAL at 22:00

## 2018-03-16 RX ADMIN — LISINOPRIL 20 MILLIGRAM(S): 2.5 TABLET ORAL at 05:41

## 2018-03-16 RX ADMIN — OXYCODONE HYDROCHLORIDE 5 MILLIGRAM(S): 5 TABLET ORAL at 13:55

## 2018-03-16 RX ADMIN — FAMOTIDINE 20 MILLIGRAM(S): 10 INJECTION INTRAVENOUS at 13:25

## 2018-03-16 RX ADMIN — Medication 500 MILLIGRAM(S): at 13:24

## 2018-03-16 RX ADMIN — OXYCODONE HYDROCHLORIDE 5 MILLIGRAM(S): 5 TABLET ORAL at 23:00

## 2018-03-16 NOTE — DISCHARGE NOTE ADULT - MEDICATION SUMMARY - MEDICATIONS TO TAKE
I will START or STAY ON the medications listed below when I get home from the hospital:    Ecotrin 325 mg oral delayed release tablet  -- 1 tab(s) by mouth once a day   -- Swallow whole.  Do not crush.  Take with food or milk.    -- Indication: For vte ppx    Percocet 5/325 oral tablet  -- 1-2 tab(s) by mouth every 4 hours MDD:10 tabs   -- Caution federal law prohibits the transfer of this drug to any person other  than the person for whom it was prescribed.  May cause drowsiness.  Alcohol may intensify this effect.  Use care when operating dangerous machinery.  This prescription cannot be refilled.  This product contains acetaminophen.  Do not use  with any other product containing acetaminophen to prevent possible liver damage.  Using more of this medication than prescribed may cause serious breathing problems.    -- Indication: For pain    hydrochlorothiazide-lisinopril  -- Indication: For home med    docusate sodium 100 mg oral capsule  -- 1 cap(s) by mouth 3 times a day  -- Indication: For constipation    senna oral tablet  -- 2 tab(s) by mouth once a day (at bedtime)  -- Indication: For constipation I will START or STAY ON the medications listed below when I get home from the hospital:    Ecotrin 325 mg oral delayed release tablet  -- 1 tab(s) by mouth once a day   -- Swallow whole.  Do not crush.  Take with food or milk.    -- Indication: For vte ppx    Percocet 5/325 oral tablet  -- 1-2 tab(s) by mouth every 4 hours MDD:10 tabs   -- Caution federal law prohibits the transfer of this drug to any person other  than the person for whom it was prescribed.  May cause drowsiness.  Alcohol may intensify this effect.  Use care when operating dangerous machinery.  This prescription cannot be refilled.  This product contains acetaminophen.  Do not use  with any other product containing acetaminophen to prevent possible liver damage.  Using more of this medication than prescribed may cause serious breathing problems.    -- Indication: For pain    hydrochlorothiazide-lisinopril  -- Indication: For home med    Keflex 500 mg oral capsule  -- 1 cap(s) by mouth every 12 hours MDD:2  -- Finish all this medication unless otherwise directed by prescriber.    -- Indication: For Infection       docusate sodium 100 mg oral capsule  -- 1 cap(s) by mouth 3 times a day  -- Indication: For constipation    senna oral tablet  -- 2 tab(s) by mouth once a day (at bedtime)  -- Indication: For constipation

## 2018-03-16 NOTE — DISCHARGE NOTE ADULT - CARE PROVIDER_API CALL
Nate Loya), Orthopaedic Surgery  [address] [place] [address]  Phone: [phone]  Fax: [phone]

## 2018-03-16 NOTE — PROGRESS NOTE ADULT - SUBJECTIVE AND OBJECTIVE BOX
Pt S&E. Pain controlled. No acute events overnight    Vital Signs Last 24 Hrs  T(C): 36.5 (16 Mar 2018 04:57), Max: 37.1 (15 Mar 2018 17:23)  T(F): 97.7 (16 Mar 2018 04:57), Max: 98.8 (15 Mar 2018 17:23)  HR: 64 (16 Mar 2018 04:57) (64 - 83)  BP: 127/80 (16 Mar 2018 04:57) (118/71 - 135/80)  BP(mean): --  RR: 18 (16 Mar 2018 04:57) (16 - 18)  SpO2: 97% (16 Mar 2018 04:57) (93% - 98%)    Gen: NAD  LLE:  Posterior splint c/d/i, +wound vac  SILT L2-S1  Able to wiggle toes  Brisk cap refill  Soft compartments, - calf ttp Pt S&E. Pain controlled. No acute events overnight    Vital Signs Last 24 Hrs  T(C): 36.5 (16 Mar 2018 04:57), Max: 37.1 (15 Mar 2018 17:23)  T(F): 97.7 (16 Mar 2018 04:57), Max: 98.8 (15 Mar 2018 17:23)  HR: 64 (16 Mar 2018 04:57) (64 - 83)  BP: 127/80 (16 Mar 2018 04:57) (118/71 - 135/80)  BP(mean): --  RR: 18 (16 Mar 2018 04:57) (16 - 18)  SpO2: 97% (16 Mar 2018 04:57) (93% - 98%)    Gen: NAD  LLE:  Posterior splint c/d/i, +wound vac  SILT L2-S1  Able to wiggle toes  Brisk cap refill  Soft compartments, - calf ttp    Wound Cx - Staph Aureus with pan-sensitivity

## 2018-03-16 NOTE — DISCHARGE NOTE ADULT - PATIENT PORTAL LINK FT
You can access the [facility] Patient Portal, offered by [facility], by registering with the following website: [email or web address]

## 2018-03-16 NOTE — DISCHARGE NOTE ADULT - PLAN OF CARE
return to baseline function WOUND CARE:  Leave surgical dressing intact until follow up. Please keep incisions clean and dry. Keep splint clean, dry and intact at all times.  ACTIVITY:  non-weight bearing on affected side with assistive devices as needed.  DIET: Return to your usual diet.  NOTIFY YOUR SURGEON IF: You have any bleeding that does not stop, any pus draining from your wound(s), any fever (over 100.4 F) or chills, or if your pain is not controlled on your discharge pain medications.  FOLLOW-UP: Please follow up with your surgeon, Dr. Loya in 7-10 days. Please call [phone] for appointment. WOUND CARE:  Leave surgical dressing intact until follow up. Please keep incisions clean and dry. Keep splint clean, dry and intact at all times.  Wound VAC dressing care per protocol.  ACTIVITY:  non-weight bearing on affected side with assistive devices as needed.  DIET: Return to your usual diet.  NOTIFY YOUR SURGEON IF: You have any bleeding that does not stop, any pus draining from your wound(s), any fever (over 100.4 F) or chills, or if your pain is not controlled on your discharge pain medications.  FOLLOW-UP: Please follow up with your surgeon, Dr. Loya in 7-10 days. Please call [phone] for appointment.

## 2018-03-16 NOTE — PROGRESS NOTE ADULT - ASSESSMENT
36 YO M s/p L ankle I&D/MARIA ELENA and vac placement POD2  Pain control  NWB LLE in posterior slab  PT  Vac changes (MWF)  FU BCx  ID recs  Further recs to follow

## 2018-03-16 NOTE — DISCHARGE NOTE ADULT - HOSPITAL COURSE
37M sent to ED for evaluation of L ankle surgical incision leakage/erythema, s/p L ankle ORIF with Dr Loya 1/12/18. He first noticed the redness a few weeks ago, along with some clear oozing from the incision. He was seen by Dr Loya in the office today, and sent to ED I&D. Pt denies any numbnes/tingling to affected extremity. No fevers/chills. No other complaints.  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  Patient was admitted to undergo irrigation/debridement, removal of ankle hardware and placement of wound vac.  The patient tolerated the procedure well and was transferred to the floor in stable condition. Postoperatively, the patient's pain was well controlled with IV pain medications and later transitioned to PO pain meds, with adequate pain control. The patient participated well with PT starting POD1. Patient is stable for discharge at this time. Patient in agreement with discharge plan.

## 2018-03-16 NOTE — DISCHARGE NOTE ADULT - CARE PLAN
Goal:	return to baseline function  Assessment and plan of treatment:	WOUND CARE:  Leave surgical dressing intact until follow up. Please keep incisions clean and dry. Keep splint clean, dry and intact at all times.  ACTIVITY:  non-weight bearing on affected side with assistive devices as needed.  DIET: Return to your usual diet.  NOTIFY YOUR SURGEON IF: You have any bleeding that does not stop, any pus draining from your wound(s), any fever (over 100.4 F) or chills, or if your pain is not controlled on your discharge pain medications.  FOLLOW-UP: Please follow up with your surgeon, Dr. Loya in 7-10 days. Please call [phone] for appointment. Principal Discharge DX:	Surgical site infection  Goal:	return to baseline function  Assessment and plan of treatment:	WOUND CARE:  Leave surgical dressing intact until follow up. Please keep incisions clean and dry. Keep splint clean, dry and intact at all times.  ACTIVITY:  non-weight bearing on affected side with assistive devices as needed.  DIET: Return to your usual diet.  NOTIFY YOUR SURGEON IF: You have any bleeding that does not stop, any pus draining from your wound(s), any fever (over 100.4 F) or chills, or if your pain is not controlled on your discharge pain medications.  FOLLOW-UP: Please follow up with your surgeon, Dr. Loya in 7-10 days. Please call [phone] for appointment. Principal Discharge DX:	Surgical site infection  Goal:	return to baseline function  Assessment and plan of treatment:	WOUND CARE:  Leave surgical dressing intact until follow up. Please keep incisions clean and dry. Keep splint clean, dry and intact at all times.  Wound VAC dressing care per protocol.  ACTIVITY:  non-weight bearing on affected side with assistive devices as needed.  DIET: Return to your usual diet.  NOTIFY YOUR SURGEON IF: You have any bleeding that does not stop, any pus draining from your wound(s), any fever (over 100.4 F) or chills, or if your pain is not controlled on your discharge pain medications.  FOLLOW-UP: Please follow up with your surgeon, Dr. Loya in 7-10 days. Please call [phone] for appointment.

## 2018-03-16 NOTE — PROGRESS NOTE ADULT - SUBJECTIVE AND OBJECTIVE BOX
Patient is a 37y old  Male who presents with a chief complaint of Infection of L foot surgical site (16 Mar 2018 09:05)      HPI:  Patient resting in bed no fever chills or fob  No abdominal pain nausea  vomiting or diarrhea.  Continue IV antibiotics  S/P removal of hardware left ankle  Ne neuropraxia.    MEDICATIONS  (STANDING):  acetaminophen  IVPB. 1000 milliGRAM(s) IV Intermittent once  ascorbic acid 500 milliGRAM(s) Oral daily  aspirin enteric coated 325 milliGRAM(s) Oral daily  ceFAZolin   IVPB 1000 milliGRAM(s) IV Intermittent every 8 hours  docusate sodium 100 milliGRAM(s) Oral three times a day  famotidine    Tablet 20 milliGRAM(s) Oral daily  hydrochlorothiazide 12.5 milliGRAM(s) Oral daily  ketorolac   Injectable 15 milliGRAM(s) IV Push once  lactated ringers. 1000 milliLiter(s) (100 mL/Hr) IV Continuous <Continuous>  lisinopril 20 milliGRAM(s) Oral daily  multivitamin 1 Tablet(s) Oral daily  senna 2 Tablet(s) Oral at bedtime    MEDICATIONS  (PRN):  acetaminophen   Tablet 650 milliGRAM(s) Oral every 6 hours PRN For Temp greater than 38 C (100.4 F)  HYDROmorphone  Injectable 1 milliGRAM(s) IV Push every 4 hours PRN breakthrough  ondansetron Injectable 4 milliGRAM(s) IV Push every 6 hours PRN Nausea and/or Vomiting  oxyCODONE    IR 5 milliGRAM(s) Oral every 3 hours PRN Moderate Pain (4 - 6)  oxyCODONE    IR 10 milliGRAM(s) Oral every 3 hours PRN Severe Pain (7 - 10)  zolpidem 5 milliGRAM(s) Oral at bedtime PRN Insomnia      Allergies    No Known Allergies    Intolerances      VITALS:   T(C): 36.3 (03-16-18 @ 08:55), Max: 37.1 (03-15-18 @ 17:23)  HR: 67 (03-16-18 @ 08:55) (64 - 83)  BP: 127/84 (03-16-18 @ 08:55) (121/70 - 135/80)  RR: 17 (03-16-18 @ 08:55) (16 - 18)  SpO2: 97% (03-16-18 @ 08:55) (93% - 97%)  Wt(kg): --    03-15 @ 07:01  -  03-16 @ 07:00  --------------------------------------------------------  IN: 1230 mL / OUT: 0 mL / NET: 1230 mL    03-16 @ 07:01  -  03-16 @ 13:27  --------------------------------------------------------  IN: 360 mL / OUT: 0 mL / NET: 360 mL        PHYSICAL EXAM:  GENERAL: NAD, well nourished and conversant  HEAD:  Atraumatic  EYES: EOM, PERRLA, conjunctiva pink and sclera white  ENT: No tonsillar erythema, exudates, or enlargement, moist mucous membranes, good dentition, no lesions  NECK: Supple, No JVD, normal thyroid, carotids with normal upstrokes and no bruits  CHEST/LUNG: Clear to auscultation bilaterally, No rales, rhonchi, wheezing, or rubs  HEART: Regular rate and rhythm, No murmurs, rubs, or gallops  ABDOMEN: Soft, nondistended, no masses, guarding, tenderness or rebound, bowel sounds present  EXTREMITIES:  2+ Peripheral Pulses, No clubbing, cyanosis, or edema. No arthritis of shoulders, elbows, hands, hips, knees, ankles, or feet.  s/p MARIA ELENA left ankle  LYMPH: No lymphadenopathy noted  SKIN: No rashes or lesions  NERVOUS SYSTEM:  Alert & Oriented X3, normal cognitive function. Motor Strength 5/5 right upper and right lower.  5/5 left upper and left lower extremities, DTRs 2+ intact and symmetric    LABS:                          12.4   16.8  )-----------( 409      ( 15 Mar 2018 06:36 )             36.1     03-15    139  |  101  |  11  ----------------------------<  96  3.8   |  24  |  0.82  03-14    140  |  103  |  17  ----------------------------<  104<H>  4.2   |  24  |  0.94    Ca    9.5      15 Mar 2018 06:36  Ca    9.4      14 Mar 2018 05:03      CAPILLARY BLOOD GLUCOSE          RADIOLOGY & ADDITIONAL TESTS:      Consultant(s):    Care Discussed with Consultants/Other Providers [ ] YES  [ ] NO

## 2018-03-16 NOTE — PROGRESS NOTE ADULT - SUBJECTIVE AND OBJECTIVE BOX
CC: f/u for left ankle ORIF infection     Patient reports feeling well     REVIEW OF SYSTEMS:  All other review of systems negative (Comprehensive ROS)    Antimicrobials Day #  : 3  ceFAZolin   IVPB 1000 milliGRAM(s) IV Intermittent every 8 hours    Other Medications Reviewed    T(F): 97.4 (03-16-18 @ 08:55), Max: 98.8 (03-15-18 @ 17:23)  HR: 67 (03-16-18 @ 08:55)  BP: 127/84 (03-16-18 @ 08:55)  RR: 17 (03-16-18 @ 08:55)  SpO2: 97% (03-16-18 @ 08:55)  Wt(kg): --    PHYSICAL EXAM:  General: alert, no acute distress  Eyes:  anicteric, no conjunctival injection, no discharge  Oropharynx: no lesions or injection 	  Neck: supple, without adenopathy  Lungs: clear to auscultation  Heart: regular rate and rhythm; no murmur, rubs or gallops  Abdomen: soft, nondistended, nontender, without mass or organomegaly  Skin: no lesions  Extremities: no clubbing, cyanosis, or edema. Left foot dressed   Neurologic: alert, oriented, moves all extremities    LAB RESULTS:                        12.4   16.8  )-----------( 409      ( 15 Mar 2018 06:36 )             36.1     03-15    139  |  101  |  11  ----------------------------<  96  3.8   |  24  |  0.82    Ca    9.5      15 Mar 2018 06:36          MICROBIOLOGY:  RECENT CULTURES:  03-14 @ 14:21 .Surgical Swab 2. wound abscess left ankle Staphylococcus aureus    Few Staphylococcus aureus      03-14 @ 14:05 .Surgical Swab 3. wound abscess left ankle     No growth      03-14 @ 13:55 .Surgical Swab 1. wound abscess left ankle     Rare Staphylococcus aureus  Susceptibility to follow.      03-13 @ 06:27 .Blood Blood     No growth to date.        RADIOLOGY REVIEWED:

## 2018-03-16 NOTE — PROGRESS NOTE ADULT - ASSESSMENT
37y male with PMH significant for HTN   Admitted to [facility] on 1/07/18 post fall & found to have bimalleolar fracture of the ankle.   S/p ORIF on 3/12/18.   On 3/02/18 he noted that left ankle was unusually painful, followed by redness & swelling, furthermore, several days later incision dehisced slightly & pus came out  Started on Keflex on 3/14/18  Seen by orthopedic surgeon on 3/12/18 & hospitalized for concerns of infected hardware  Redness of ankle & edema improved remarkably on Keflex - suspect a Keflex susceptible pathogen  S/p I & D of left ankle & MARIA ELENA on 3/14/18  OR findings reviewed - no pus noted - 3 sinus tracts noted  OR cx are recovering MSSA     Plan:   Continue IV Cefazolin.  Will review duration of IV antibiotics with Dr. Loya - call placed to his office.

## 2018-03-17 RX ADMIN — OXYCODONE HYDROCHLORIDE 5 MILLIGRAM(S): 5 TABLET ORAL at 14:42

## 2018-03-17 RX ADMIN — Medication 1 TABLET(S): at 13:33

## 2018-03-17 RX ADMIN — Medication 12.5 MILLIGRAM(S): at 06:24

## 2018-03-17 RX ADMIN — OXYCODONE HYDROCHLORIDE 10 MILLIGRAM(S): 5 TABLET ORAL at 09:17

## 2018-03-17 RX ADMIN — Medication 325 MILLIGRAM(S): at 13:33

## 2018-03-17 RX ADMIN — OXYCODONE HYDROCHLORIDE 10 MILLIGRAM(S): 5 TABLET ORAL at 08:47

## 2018-03-17 RX ADMIN — Medication 500 MILLIGRAM(S): at 13:33

## 2018-03-17 RX ADMIN — OXYCODONE HYDROCHLORIDE 10 MILLIGRAM(S): 5 TABLET ORAL at 22:09

## 2018-03-17 RX ADMIN — LISINOPRIL 20 MILLIGRAM(S): 2.5 TABLET ORAL at 06:24

## 2018-03-17 RX ADMIN — Medication 100 MILLIGRAM(S): at 22:10

## 2018-03-17 RX ADMIN — FAMOTIDINE 20 MILLIGRAM(S): 10 INJECTION INTRAVENOUS at 13:32

## 2018-03-17 RX ADMIN — OXYCODONE HYDROCHLORIDE 10 MILLIGRAM(S): 5 TABLET ORAL at 23:09

## 2018-03-17 RX ADMIN — OXYCODONE HYDROCHLORIDE 5 MILLIGRAM(S): 5 TABLET ORAL at 14:12

## 2018-03-17 RX ADMIN — Medication 100 MILLIGRAM(S): at 06:24

## 2018-03-17 RX ADMIN — Medication 100 MILLIGRAM(S): at 13:33

## 2018-03-17 NOTE — PROGRESS NOTE ADULT - SUBJECTIVE AND OBJECTIVE BOX
CC: f/u for left ankle ORIF infection    Patient reports that he feels good & hoping to go home soon     REVIEW OF SYSTEMS:  All other review of systems negative (Comprehensive ROS)    Antimicrobials Day #  : 4/21  ceFAZolin   IVPB 1000 milliGRAM(s) IV Intermittent every 8 hours    Other Medications Reviewed    T(F): 98.7 (03-17-18 @ 09:29), Max: 98.8 (03-16-18 @ 20:56)  HR: 90 (03-17-18 @ 09:29)  BP: 113/78 (03-17-18 @ 09:29)  RR: 18 (03-17-18 @ 09:29)  SpO2: 97% (03-17-18 @ 09:29)  Wt(kg): --    PHYSICAL EXAM:  General: alert, no acute distress  Eyes:  anicteric, no conjunctival injection, no discharge  Oropharynx: no lesions or injection 	  Neck: supple, without adenopathy  Lungs: clear to auscultation  Heart: regular rate and rhythm; no murmur, rubs or gallops  Abdomen: soft, nondistended, nontender, without mass or organomegaly  Skin: no lesions  Extremities: no clubbing, cyanosis, or edema. Left foot is dressed   Neurologic: alert, oriented, moves all extremities    LAB RESULTS:        MICROBIOLOGY:  RECENT CULTURES:  03-14 @ 14:21 .Surgical Swab 2. wound abscess left ankle Staphylococcus aureus    Few Staphylococcus aureus      03-14 @ 14:05 .Surgical Swab 3. wound abscess left ankle     No growth      03-14 @ 13:55 .Surgical Swab 1. wound abscess left ankle     Rare Staphylococcus aureus  Susceptibility to follow.      03-13 @ 06:27 .Blood Blood     No growth to date.                RADIOLOGY REVIEWED:

## 2018-03-17 NOTE — PROGRESS NOTE ADULT - SUBJECTIVE AND OBJECTIVE BOX
Patient is a 37y old  Male who presents with a chief complaint of Infection of L foot surgical site (16 Mar 2018 09:05)      HPI:    MEDICATIONS  (STANDING):  acetaminophen  IVPB. 1000 milliGRAM(s) IV Intermittent once  ascorbic acid 500 milliGRAM(s) Oral daily  aspirin enteric coated 325 milliGRAM(s) Oral daily  ceFAZolin   IVPB 1000 milliGRAM(s) IV Intermittent every 8 hours  docusate sodium 100 milliGRAM(s) Oral three times a day  famotidine    Tablet 20 milliGRAM(s) Oral daily  hydrochlorothiazide 12.5 milliGRAM(s) Oral daily  ketorolac   Injectable 15 milliGRAM(s) IV Push once  lactated ringers. 1000 milliLiter(s) (100 mL/Hr) IV Continuous <Continuous>  lisinopril 20 milliGRAM(s) Oral daily  multivitamin 1 Tablet(s) Oral daily  senna 2 Tablet(s) Oral at bedtime    MEDICATIONS  (PRN):  acetaminophen   Tablet 650 milliGRAM(s) Oral every 6 hours PRN For Temp greater than 38 C (100.4 F)  HYDROmorphone  Injectable 1 milliGRAM(s) IV Push every 4 hours PRN breakthrough  ondansetron Injectable 4 milliGRAM(s) IV Push every 6 hours PRN Nausea and/or Vomiting  oxyCODONE    IR 5 milliGRAM(s) Oral every 3 hours PRN Moderate Pain (4 - 6)  oxyCODONE    IR 10 milliGRAM(s) Oral every 3 hours PRN Severe Pain (7 - 10)  zolpidem 5 milliGRAM(s) Oral at bedtime PRN Insomnia      Allergies    No Known Allergies    Intolerances        REVIEW OF SYSTEM:  CONSTITUTIONAL: No fever, No change in weight, No fatigue  HEAD: No headache, No dizziness, No recent trauma  EYES: No eye pain, No visual disturbances, No discharge  ENT:  No difficulty hearing, No tinnitus, No vertigo, No sinus pain, No throat pain  NECK: No pain, No stiffness  BREASTS: No pain, No masses, No nipple discharge  RESPIRATORY: No cough, No wheezing, No chills, No hemoptysis, No shortness of breath at rest or exertional shortness of breath  CARDIOVASCULAR: No chest pain, No palpitations, No dizziness, No CHF, No arrhythmia, No cardiomegaly, No leg swelling  GASTROINTESTINAL: No abdominal, No epigastric pain. No nausea, No vomiting, No hematemesis, No diarrhea, No constipation. No melena, No hematochezia. No GERD  GENITOURINARY: No dysuria, No frequency, No hematuria, No incontinence, No nocturia, No hesitancy,  SKIN: No itching, No burning, No rashes, No lesions   LYMPH NODES: No history of enlarged glands  ENDOCRINE: No heat or cold intolerance, No hair loss. No osteoporosis, No thyroid disease  MUSCULOSKELETAL: No joint pain or swelling, No muscle, back, or extremity pain  PSYCHIATRIC: No depression, No anxiety, No mood swings, No difficulty sleeping  HEME/LYMPH: No easy bruising, No anticoagulants, No bleeding disorder, No bleeding gums  ALLERGY AND IMMUNOLOGIC: No hives, No eczema  NEUROLOGICAL: No memory loss, No loss of strength, No numbness, No tremors        VITALS:   T(C): 36.9 (03-17-18 @ 13:13), Max: 37.1 (03-16-18 @ 20:56)  HR: 83 (03-17-18 @ 13:13) (72 - 90)  BP: 108/71 (03-17-18 @ 13:13) (108/71 - 123/78)  RR: 18 (03-17-18 @ 13:13) (17 - 18)  SpO2: 96% (03-17-18 @ 13:13) (96% - 98%)  Wt(kg): --    03-16 @ 07:01  -  03-17 @ 07:00  --------------------------------------------------------  IN: 1130 mL / OUT: 5 mL / NET: 1125 mL    03-17 @ 07:01  -  03-17 @ 15:05  --------------------------------------------------------  IN: 530 mL / OUT: 0 mL / NET: 530 mL        PHYSICAL EXAM:  GENERAL: NAD, well nourished and conversant  HEAD:  Atraumatic  EYES: EOM, PERRLA, conjunctiva pink and sclera white  ENT: No tonsillar erythema, exudates, or enlargement, moist mucous membranes, good dentition, no lesions  NECK: Supple, No JVD, normal thyroid, carotids with normal upstrokes and no bruits  CHEST/LUNG: Clear to auscultation bilaterally, No rales, rhonchi, wheezing, or rubs  HEART: Regular rate and rhythm, No murmurs, rubs, or gallops  ABDOMEN: Soft, nondistended, no masses, guarding, tenderness or rebound, bowel sounds present  EXTREMITIES:  2+ Peripheral Pulses, No clubbing, cyanosis, or edema. No arthritis of shoulders, elbows, hands, hips, knees, ankles, or feet. No DJD C spine, T spine, or L/S spine  LYMPH: No lymphadenopathy noted  SKIN: No rashes or lesions  NERVOUS SYSTEM:  Alert & Oriented X3, normal cognitive function. Motor Strength 5/5 right upper and right lower.  5/5 left upper and left lower extremities, DTRs 2+ intact and symmetric    LABS:      03-15    139  |  101  |  11  ----------------------------<  96  3.8   |  24  |  0.82    Ca    9.5      15 Mar 2018 06:36      CAPILLARY BLOOD GLUCOSE          RADIOLOGY & ADDITIONAL TESTS:      Consultant(s):    Care Discussed with Consultants/Other Providers [ ] YES  [ ] NO Patient is a 37y old  Male who presents with a chief complaint of Infection of L foot surgical site (16 Mar 2018 09:05)      HPI:  Sitting up in chair. Comfortable  Wound vac in place over surgical incision s/p removal of hardware. encouraged to do incentive spirometry, DVT prophylaxis    MEDICATIONS  (STANDING):  acetaminophen  IVPB. 1000 milliGRAM(s) IV Intermittent once  ascorbic acid 500 milliGRAM(s) Oral daily  aspirin enteric coated 325 milliGRAM(s) Oral daily  ceFAZolin   IVPB 1000 milliGRAM(s) IV Intermittent every 8 hours  docusate sodium 100 milliGRAM(s) Oral three times a day  famotidine    Tablet 20 milliGRAM(s) Oral daily  hydrochlorothiazide 12.5 milliGRAM(s) Oral daily  ketorolac   Injectable 15 milliGRAM(s) IV Push once  lactated ringers. 1000 milliLiter(s) (100 mL/Hr) IV Continuous <Continuous>  lisinopril 20 milliGRAM(s) Oral daily  multivitamin 1 Tablet(s) Oral daily  senna 2 Tablet(s) Oral at bedtime    MEDICATIONS  (PRN):  acetaminophen   Tablet 650 milliGRAM(s) Oral every 6 hours PRN For Temp greater than 38 C (100.4 F)  HYDROmorphone  Injectable 1 milliGRAM(s) IV Push every 4 hours PRN breakthrough  ondansetron Injectable 4 milliGRAM(s) IV Push every 6 hours PRN Nausea and/or Vomiting  oxyCODONE    IR 5 milliGRAM(s) Oral every 3 hours PRN Moderate Pain (4 - 6)  oxyCODONE    IR 10 milliGRAM(s) Oral every 3 hours PRN Severe Pain (7 - 10)  zolpidem 5 milliGRAM(s) Oral at bedtime PRN Insomnia      Allergies    No Known Allergies    Intolerances          VITALS:   T(C): 36.9 (03-17-18 @ 13:13), Max: 37.1 (03-16-18 @ 20:56)  HR: 83 (03-17-18 @ 13:13) (72 - 90)  BP: 108/71 (03-17-18 @ 13:13) (108/71 - 123/78)  RR: 18 (03-17-18 @ 13:13) (17 - 18)  SpO2: 96% (03-17-18 @ 13:13) (96% - 98%)  Wt(kg): --    03-16 @ 07:01  -  03-17 @ 07:00  --------------------------------------------------------  IN: 1130 mL / OUT: 5 mL / NET: 1125 mL    03-17 @ 07:01 - 03-17 @ 15:05  --------------------------------------------------------  IN: 530 mL / OUT: 0 mL / NET: 530 mL        PHYSICAL EXAM:  GENERAL: NAD, well nourished and conversant  HEAD:  Atraumatic  EYES: EOM, PERRLA, conjunctiva pink and sclera white  ENT: No tonsillar erythema, exudates, or enlargement, moist mucous membranes, good dentition, no lesions  NECK: Supple, No JVD, normal thyroid, carotids with normal upstrokes and no bruits  CHEST/LUNG: Clear to auscultation bilaterally, No rales, rhonchi, wheezing, or rubs  HEART: Regular rate and rhythm, No murmurs, rubs, or gallops  ABDOMEN: Soft, nondistended, no masses, guarding, tenderness or rebound, bowel sounds present  EXTREMITIES:  2+ Peripheral Pulses, No clubbing, cyanosis, or edema. Wound vac in place over left ankle  LYMPH: No lymphadenopathy noted  SKIN: No rashes or lesions  NERVOUS SYSTEM:  Alert & Oriented X3, normal cognitive function. Motor Strength 5/5 right upper and right lower.  5/5 left upper and left lower extremities, DTRs 2+ intact and symmetric    LABS:      03-15    139  |  101  |  11  ----------------------------<  96  3.8   |  24  |  0.82    Ca    9.5      15 Mar 2018 06:36      CAPILLARY BLOOD GLUCOSE          RADIOLOGY & ADDITIONAL TESTS:      Consultant(s):    Care Discussed with Consultants/Other Providers [ ] YES  [ ] NO

## 2018-03-17 NOTE — PROGRESS NOTE ADULT - ASSESSMENT
37y male with PMH significant for HTN   Admitted to [facility] on 1/07/18 post fall & found to have bimalleolar fracture of the ankle.   S/p ORIF on 3/12/18.   On 3/02/18 he noted that left ankle was unusually painful, followed by redness & swelling, furthermore, several days later incision dehisced slightly & pus came out  Started on Keflex on 3/14/18  Seen by orthopedic surgeon on 3/12/18 & hospitalized for concerns of infected hardware  Redness of ankle & edema had improved remarkably on Keflex even PTA  S/p I & D of left ankle & MARIA ELENA on 3/14/18  OR findings reviewed - no pus noted - 3 sinus tracts noted  OR cx recovered MSSA     Plan:   Continue IV Cefazolin.  Case reviewed with ortho-resident - there is no concern for osteomyelitis. Ortho favors a 3 wks course of PO antibiotics for deep soft tissue infection. He can stay on Keflex as inpatient, changing it to PO Keflex 1 gram TID upon d/c - Today is D # 4/21

## 2018-03-18 LAB
CULTURE RESULTS: SIGNIFICANT CHANGE UP
CULTURE RESULTS: SIGNIFICANT CHANGE UP
SPECIMEN SOURCE: SIGNIFICANT CHANGE UP
SPECIMEN SOURCE: SIGNIFICANT CHANGE UP

## 2018-03-18 RX ADMIN — Medication 100 MILLIGRAM(S): at 22:50

## 2018-03-18 RX ADMIN — OXYCODONE HYDROCHLORIDE 5 MILLIGRAM(S): 5 TABLET ORAL at 10:54

## 2018-03-18 RX ADMIN — LISINOPRIL 20 MILLIGRAM(S): 2.5 TABLET ORAL at 05:56

## 2018-03-18 RX ADMIN — Medication 100 MILLIGRAM(S): at 13:05

## 2018-03-18 RX ADMIN — OXYCODONE HYDROCHLORIDE 5 MILLIGRAM(S): 5 TABLET ORAL at 10:24

## 2018-03-18 RX ADMIN — Medication 325 MILLIGRAM(S): at 13:04

## 2018-03-18 RX ADMIN — Medication 100 MILLIGRAM(S): at 05:56

## 2018-03-18 RX ADMIN — Medication 1 TABLET(S): at 13:04

## 2018-03-18 RX ADMIN — FAMOTIDINE 20 MILLIGRAM(S): 10 INJECTION INTRAVENOUS at 13:04

## 2018-03-18 RX ADMIN — Medication 12.5 MILLIGRAM(S): at 05:56

## 2018-03-18 RX ADMIN — Medication 500 MILLIGRAM(S): at 13:04

## 2018-03-18 NOTE — PROGRESS NOTE ADULT - ASSESSMENT
37y male with PMH significant for HTN   Admitted to [facility] on 1/07/18 post fall & found to have bimalleolar fracture of the ankle.   S/p ORIF on 01/12/18.   On 3/02/18 he noted that left ankle was unusually painful, followed by redness & swelling, furthermore, several days later incision dehisced slightly & pus came out  Started on Keflex on 3/09/18  Seen by Dr. Loya on 3/12/18 & hospitalized for concerns of infected hardware  Redness of ankle & edema had improved remarkably on Keflex even PTA  S/p I & D of left ankle & MARIA ELENA on 3/14/18  OR findings reviewed - no pus noted - 3 sinus tracts noted  OR cx recovered MSSA     Plan:   Continue IV Cefazolin.  Case reviewed with Dr. Loya -no concern for osteomyelitis in OR. Fracture had healed & hardware was removed. Bone submitted for path. He favors a 3 wks course of PO antibiotics for deep soft tissue infection. He can stay on IV cefazolin as inpatient, changing it to PO Keflex 1 gram TID upon d/c - Today is D # 5/21  Will check CBC & CMP in AM

## 2018-03-18 NOTE — PROGRESS NOTE ADULT - SUBJECTIVE AND OBJECTIVE BOX
CC: f/u for  left ankle ORIF infection    Patient hoping to go home tomorrow     REVIEW OF SYSTEMS:  All other review of systems negative (Comprehensive ROS)    Antimicrobials Day #  : 5/21  ceFAZolin   IVPB 1000 milliGRAM(s) IV Intermittent every 8 hours    Other Medications Reviewed    T(F): 98.7 (03-18-18 @ 09:37), Max: 99 (03-17-18 @ 21:23)  HR: 76 (03-18-18 @ 09:37)  BP: 104/70 (03-18-18 @ 09:37)  RR: 18 (03-18-18 @ 09:37)  SpO2: 96% (03-18-18 @ 09:37)  Wt(kg): --    PHYSICAL EXAM:  General: alert, no acute distress  Eyes:  anicteric, no conjunctival injection, no discharge  Oropharynx: no lesions or injection 	  Neck: supple, without adenopathy  Lungs: clear to auscultation  Heart: regular rate and rhythm; no murmur, rubs or gallops  Abdomen: soft, nondistended, nontender, without mass or organomegaly  Skin: no lesions  Extremities: no clubbing, cyanosis, or edema. left foot dressed   Neurologic: alert, oriented, moves all extremities    LAB RESULTS:    MICROBIOLOGY:  RECENT CULTURES:  03-14 @ 14:21 .Surgical Swab 2. wound abscess left ankle Staphylococcus aureus    Few Staphylococcus aureus      03-14 @ 14:05 .Surgical Swab 3. wound abscess left ankle     No growth      03-14 @ 13:55 .Surgical Swab 1. wound abscess left ankle Staphylococcus aureus    Rare Staphylococcus aureus            RADIOLOGY REVIEWED:

## 2018-03-18 NOTE — PROGRESS NOTE ADULT - ASSESSMENT
36 yo male s/p removal of infected hardware with wound vac in place.  Patient removed splint on the left leg, VAC in place

## 2018-03-18 NOTE — PROGRESS NOTE ADULT - SUBJECTIVE AND OBJECTIVE BOX
Patient is a 37y old  Male who presents with a chief complaint of Infection of L foot surgical site (16 Mar 2018 09:05)      HPI:    MEDICATIONS  (STANDING):  acetaminophen  IVPB. 1000 milliGRAM(s) IV Intermittent once  ascorbic acid 500 milliGRAM(s) Oral daily  aspirin enteric coated 325 milliGRAM(s) Oral daily  ceFAZolin   IVPB 1000 milliGRAM(s) IV Intermittent every 8 hours  docusate sodium 100 milliGRAM(s) Oral three times a day  famotidine    Tablet 20 milliGRAM(s) Oral daily  hydrochlorothiazide 12.5 milliGRAM(s) Oral daily  ketorolac   Injectable 15 milliGRAM(s) IV Push once  lactated ringers. 1000 milliLiter(s) (100 mL/Hr) IV Continuous <Continuous>  lisinopril 20 milliGRAM(s) Oral daily  multivitamin 1 Tablet(s) Oral daily  senna 2 Tablet(s) Oral at bedtime    MEDICATIONS  (PRN):  acetaminophen   Tablet 650 milliGRAM(s) Oral every 6 hours PRN For Temp greater than 38 C (100.4 F)  HYDROmorphone  Injectable 1 milliGRAM(s) IV Push every 4 hours PRN breakthrough  ondansetron Injectable 4 milliGRAM(s) IV Push every 6 hours PRN Nausea and/or Vomiting  oxyCODONE    IR 5 milliGRAM(s) Oral every 3 hours PRN Moderate Pain (4 - 6)  oxyCODONE    IR 10 milliGRAM(s) Oral every 3 hours PRN Severe Pain (7 - 10)  zolpidem 5 milliGRAM(s) Oral at bedtime PRN Insomnia      Allergies    No Known Allergies    Intolerances        REVIEW OF SYSTEM:  CONSTITUTIONAL: No fever, No change in weight, No fatigue  HEAD: No headache, No dizziness, No recent trauma  EYES: No eye pain, No visual disturbances, No discharge  ENT:  No difficulty hearing, No tinnitus, No vertigo, No sinus pain, No throat pain  NECK: No pain, No stiffness  BREASTS: No pain, No masses, No nipple discharge  RESPIRATORY: No cough, No wheezing, No chills, No hemoptysis, No shortness of breath at rest or exertional shortness of breath  CARDIOVASCULAR: No chest pain, No palpitations, No dizziness, No CHF, No arrhythmia, No cardiomegaly, No leg swelling  GASTROINTESTINAL: No abdominal, No epigastric pain. No nausea, No vomiting, No hematemesis, No diarrhea, No constipation. No melena, No hematochezia. No GERD  GENITOURINARY: No dysuria, No frequency, No hematuria, No incontinence, No nocturia, No hesitancy,  SKIN: No itching, No burning, No rashes, No lesions   LYMPH NODES: No history of enlarged glands  ENDOCRINE: No heat or cold intolerance, No hair loss. No osteoporosis, No thyroid disease  MUSCULOSKELETAL: No joint pain or swelling, No muscle, back, or extremity pain  PSYCHIATRIC: No depression, No anxiety, No mood swings, No difficulty sleeping  HEME/LYMPH: No easy bruising, No anticoagulants, No bleeding disorder, No bleeding gums  ALLERGY AND IMMUNOLOGIC: No hives, No eczema  NEUROLOGICAL: No memory loss, No loss of strength, No numbness, No tremors        VITALS:   T(C): 37.2 (03-18-18 @ 17:13), Max: 37.3 (03-18-18 @ 14:15)  HR: 89 (03-18-18 @ 17:13) (66 - 89)  BP: 119/77 (03-18-18 @ 17:13) (104/70 - 121/80)  RR: 18 (03-18-18 @ 17:13) (18 - 18)  SpO2: 95% (03-18-18 @ 17:13) (95% - 98%)  Wt(kg): --    03-17 @ 07:01  -  03-18 @ 07:00  --------------------------------------------------------  IN: 1190 mL / OUT: 0 mL / NET: 1190 mL    03-18 @ 07:01  -  03-18 @ 18:46  --------------------------------------------------------  IN: 750 mL / OUT: 0 mL / NET: 750 mL        PHYSICAL EXAM:  GENERAL: NAD, well nourished and conversant  HEAD:  Atraumatic  EYES: EOM, PERRLA, conjunctiva pink and sclera white  ENT: No tonsillar erythema, exudates, or enlargement, moist mucous membranes, good dentition, no lesions  NECK: Supple, No JVD, normal thyroid, carotids with normal upstrokes and no bruits  CHEST/LUNG: Clear to auscultation bilaterally, No rales, rhonchi, wheezing, or rubs  HEART: Regular rate and rhythm, No murmurs, rubs, or gallops  ABDOMEN: Soft, nondistended, no masses, guarding, tenderness or rebound, bowel sounds present  EXTREMITIES:  2+ Peripheral Pulses, No clubbing, cyanosis, or edema. No arthritis of shoulders, elbows, hands, hips, knees, ankles, or feet. No DJD C spine, T spine, or L/S spine  LYMPH: No lymphadenopathy noted  SKIN: No rashes or lesions  NERVOUS SYSTEM:  Alert & Oriented X3, normal cognitive function. Motor Strength 5/5 right upper and right lower.  5/5 left upper and left lower extremities, DTRs 2+ intact and symmetric    LABS:            CAPILLARY BLOOD GLUCOSE          RADIOLOGY & ADDITIONAL TESTS:      Consultant(s):    Care Discussed with Consultants/Other Providers [ ] YES  [ ] NO Patient is a 37y old  Male who presents with a chief complaint of Infection of L foot surgical site (16 Mar 2018 09:05)      HPI:  C/o pain from splint.  Patient removed cast and now is hopping on good leg with walker  No fever or chills.  Ortho to re-evaluate casting of the left foot.    MEDICATIONS  (STANDING):  acetaminophen  IVPB. 1000 milliGRAM(s) IV Intermittent once  ascorbic acid 500 milliGRAM(s) Oral daily  aspirin enteric coated 325 milliGRAM(s) Oral daily  ceFAZolin   IVPB 1000 milliGRAM(s) IV Intermittent every 8 hours  docusate sodium 100 milliGRAM(s) Oral three times a day  famotidine    Tablet 20 milliGRAM(s) Oral daily  hydrochlorothiazide 12.5 milliGRAM(s) Oral daily  ketorolac   Injectable 15 milliGRAM(s) IV Push once  lactated ringers. 1000 milliLiter(s) (100 mL/Hr) IV Continuous <Continuous>  lisinopril 20 milliGRAM(s) Oral daily  multivitamin 1 Tablet(s) Oral daily  senna 2 Tablet(s) Oral at bedtime    MEDICATIONS  (PRN):  acetaminophen   Tablet 650 milliGRAM(s) Oral every 6 hours PRN For Temp greater than 38 C (100.4 F)  HYDROmorphone  Injectable 1 milliGRAM(s) IV Push every 4 hours PRN breakthrough  ondansetron Injectable 4 milliGRAM(s) IV Push every 6 hours PRN Nausea and/or Vomiting  oxyCODONE    IR 5 milliGRAM(s) Oral every 3 hours PRN Moderate Pain (4 - 6)  oxyCODONE    IR 10 milliGRAM(s) Oral every 3 hours PRN Severe Pain (7 - 10)  zolpidem 5 milliGRAM(s) Oral at bedtime PRN Insomnia      Allergies    No Known Allergies    Intolerances        VITALS:   T(C): 37.2 (03-18-18 @ 17:13), Max: 37.3 (03-18-18 @ 14:15)  HR: 89 (03-18-18 @ 17:13) (66 - 89)  BP: 119/77 (03-18-18 @ 17:13) (104/70 - 121/80)  RR: 18 (03-18-18 @ 17:13) (18 - 18)  SpO2: 95% (03-18-18 @ 17:13) (95% - 98%)  Wt(kg): --    03-17 @ 07:01  -  03-18 @ 07:00  --------------------------------------------------------  IN: 1190 mL / OUT: 0 mL / NET: 1190 mL    03-18 @ 07:01 - 03-18 @ 18:46  --------------------------------------------------------  IN: 750 mL / OUT: 0 mL / NET: 750 mL        PHYSICAL EXAM:  GENERAL: NAD, well nourished and conversant  HEAD:  Atraumatic  EYES: EOM, PERRLA, conjunctiva pink and sclera white  ENT: No tonsillar erythema, exudates, or enlargement, moist mucous membranes, good dentition, no lesions  NECK: Supple, No JVD, normal thyroid, carotids with normal upstrokes and no bruits  CHEST/LUNG: Clear to auscultation bilaterally, No rales, rhonchi, wheezing, or rubs  HEART: Regular rate and rhythm, No murmurs, rubs, or gallops  ABDOMEN: Soft, nondistended, no masses, guarding, tenderness .  Repair of ankel fracture with MARIA ELENA and VAC dressing in p[lace  LYMPH: No lymphadenopathy noted  SKIN: No rashes or lesions  NERVOUS SYSTEM:  Alert & Oriented X3, normal cognitive function. Motor Strength 5/5 right upper and right lower.  5/5 left upper and left lower extremities, DTRs 2+ intact and symmetric

## 2018-03-19 VITALS
RESPIRATION RATE: 18 BRPM | OXYGEN SATURATION: 98 % | HEART RATE: 92 BPM | TEMPERATURE: 98 F | SYSTOLIC BLOOD PRESSURE: 113 MMHG | DIASTOLIC BLOOD PRESSURE: 70 MMHG

## 2018-03-19 LAB
ALBUMIN SERPL ELPH-MCNC: 4 G/DL — SIGNIFICANT CHANGE UP (ref 3.3–5)
ALP SERPL-CCNC: 93 U/L — SIGNIFICANT CHANGE UP (ref 40–120)
ALT FLD-CCNC: 49 U/L RC — HIGH (ref 10–45)
ANION GAP SERPL CALC-SCNC: 14 MMOL/L — SIGNIFICANT CHANGE UP (ref 5–17)
AST SERPL-CCNC: 28 U/L — SIGNIFICANT CHANGE UP (ref 10–40)
BILIRUB SERPL-MCNC: 0.4 MG/DL — SIGNIFICANT CHANGE UP (ref 0.2–1.2)
BUN SERPL-MCNC: 14 MG/DL — SIGNIFICANT CHANGE UP (ref 7–23)
CALCIUM SERPL-MCNC: 10 MG/DL — SIGNIFICANT CHANGE UP (ref 8.4–10.5)
CHLORIDE SERPL-SCNC: 97 MMOL/L — SIGNIFICANT CHANGE UP (ref 96–108)
CO2 SERPL-SCNC: 24 MMOL/L — SIGNIFICANT CHANGE UP (ref 22–31)
CREAT SERPL-MCNC: 0.87 MG/DL — SIGNIFICANT CHANGE UP (ref 0.5–1.3)
CULTURE RESULTS: SIGNIFICANT CHANGE UP
GLUCOSE SERPL-MCNC: 95 MG/DL — SIGNIFICANT CHANGE UP (ref 70–99)
HCT VFR BLD CALC: 43.4 % — SIGNIFICANT CHANGE UP (ref 39–50)
HGB BLD-MCNC: 14.5 G/DL — SIGNIFICANT CHANGE UP (ref 13–17)
MCHC RBC-ENTMCNC: 29.7 PG — SIGNIFICANT CHANGE UP (ref 27–34)
MCHC RBC-ENTMCNC: 33.3 GM/DL — SIGNIFICANT CHANGE UP (ref 32–36)
MCV RBC AUTO: 89.2 FL — SIGNIFICANT CHANGE UP (ref 80–100)
ORGANISM # SPEC MICROSCOPIC CNT: SIGNIFICANT CHANGE UP
PLATELET # BLD AUTO: 460 K/UL — HIGH (ref 150–400)
POTASSIUM SERPL-MCNC: 4 MMOL/L — SIGNIFICANT CHANGE UP (ref 3.5–5.3)
POTASSIUM SERPL-SCNC: 4 MMOL/L — SIGNIFICANT CHANGE UP (ref 3.5–5.3)
PROT SERPL-MCNC: 8.1 G/DL — SIGNIFICANT CHANGE UP (ref 6–8.3)
RBC # BLD: 4.86 M/UL — SIGNIFICANT CHANGE UP (ref 4.2–5.8)
RBC # FLD: 12.5 % — SIGNIFICANT CHANGE UP (ref 10.3–14.5)
SODIUM SERPL-SCNC: 135 MMOL/L — SIGNIFICANT CHANGE UP (ref 135–145)
SPECIMEN SOURCE: SIGNIFICANT CHANGE UP
WBC # BLD: 11.6 K/UL — HIGH (ref 3.8–10.5)
WBC # FLD AUTO: 11.6 K/UL — HIGH (ref 3.8–10.5)

## 2018-03-19 PROCEDURE — 86901 BLOOD TYPING SEROLOGIC RH(D): CPT

## 2018-03-19 PROCEDURE — 85730 THROMBOPLASTIN TIME PARTIAL: CPT

## 2018-03-19 PROCEDURE — 85652 RBC SED RATE AUTOMATED: CPT

## 2018-03-19 PROCEDURE — 87070 CULTURE OTHR SPECIMN AEROBIC: CPT

## 2018-03-19 PROCEDURE — 76000 FLUOROSCOPY <1 HR PHYS/QHP: CPT

## 2018-03-19 PROCEDURE — 85610 PROTHROMBIN TIME: CPT

## 2018-03-19 PROCEDURE — 87186 SC STD MICRODIL/AGAR DIL: CPT

## 2018-03-19 PROCEDURE — 97605 NEG PRS WND THER DME<=50SQCM: CPT

## 2018-03-19 PROCEDURE — 85027 COMPLETE CBC AUTOMATED: CPT

## 2018-03-19 PROCEDURE — 81003 URINALYSIS AUTO W/O SCOPE: CPT

## 2018-03-19 PROCEDURE — 86900 BLOOD TYPING SEROLOGIC ABO: CPT

## 2018-03-19 PROCEDURE — 87040 BLOOD CULTURE FOR BACTERIA: CPT

## 2018-03-19 PROCEDURE — 73610 X-RAY EXAM OF ANKLE: CPT

## 2018-03-19 PROCEDURE — 86850 RBC ANTIBODY SCREEN: CPT

## 2018-03-19 PROCEDURE — 80048 BASIC METABOLIC PNL TOTAL CA: CPT

## 2018-03-19 PROCEDURE — 99285 EMERGENCY DEPT VISIT HI MDM: CPT | Mod: 25

## 2018-03-19 PROCEDURE — 97161 PT EVAL LOW COMPLEX 20 MIN: CPT

## 2018-03-19 PROCEDURE — 88311 DECALCIFY TISSUE: CPT

## 2018-03-19 PROCEDURE — 71045 X-RAY EXAM CHEST 1 VIEW: CPT

## 2018-03-19 PROCEDURE — 86140 C-REACTIVE PROTEIN: CPT

## 2018-03-19 PROCEDURE — 88305 TISSUE EXAM BY PATHOLOGIST: CPT

## 2018-03-19 PROCEDURE — 80053 COMPREHEN METABOLIC PANEL: CPT

## 2018-03-19 RX ORDER — CEPHALEXIN 500 MG
1 CAPSULE ORAL
Qty: 56 | Refills: 0 | OUTPATIENT
Start: 2018-03-19 | End: 2018-04-15

## 2018-03-19 RX ADMIN — FAMOTIDINE 20 MILLIGRAM(S): 10 INJECTION INTRAVENOUS at 13:11

## 2018-03-19 RX ADMIN — LISINOPRIL 20 MILLIGRAM(S): 2.5 TABLET ORAL at 06:28

## 2018-03-19 RX ADMIN — Medication 500 MILLIGRAM(S): at 13:11

## 2018-03-19 RX ADMIN — Medication 100 MILLIGRAM(S): at 13:11

## 2018-03-19 RX ADMIN — Medication 100 MILLIGRAM(S): at 13:21

## 2018-03-19 RX ADMIN — Medication 12.5 MILLIGRAM(S): at 06:28

## 2018-03-19 RX ADMIN — Medication 1 TABLET(S): at 13:11

## 2018-03-19 RX ADMIN — Medication 100 MILLIGRAM(S): at 06:28

## 2018-03-19 RX ADMIN — Medication 325 MILLIGRAM(S): at 13:11

## 2018-03-19 NOTE — PROGRESS NOTE ADULT - SUBJECTIVE AND OBJECTIVE BOX
CC: f/u for left leg soft tissue infection    Patient reports feeling well.     REVIEW OF SYSTEMS:  All other review of systems negative (Comprehensive ROS)    Antimicrobials Day #  : 6/21  ceFAZolin   IVPB 1000 milliGRAM(s) IV Intermittent every 8 hours    Other Medications Reviewed    T(F): 99 (03-19-18 @ 10:40), Max: 99.1 (03-18-18 @ 14:15)  HR: 89 (03-19-18 @ 10:40)  BP: 103/71 (03-19-18 @ 10:40)  RR: 18 (03-19-18 @ 10:40)  SpO2: 96% (03-19-18 @ 10:40)  Wt(kg): --    PHYSICAL EXAM:  General: alert, no acute distress  Eyes:  anicteric, no conjunctival injection, no discharge  Oropharynx: no lesions or injection 	  Neck: supple, without adenopathy  Lungs: clear to auscultation  Heart: regular rate and rhythm; no murmur, rubs or gallops  Abdomen: soft, nondistended, nontender, without mass or organomegaly  Skin: no lesions  Extremities: no clubbing, cyanosis, or edema. Left leg - Wound vac removed to examine the wound - Intact incision with sutures. Deep wound about 1 cm along the incision with clean healthy base. No odor & no drainage. No cellulitis.   Neurologic: alert, oriented, moves all extremities    LAB RESULTS:                        14.5   11.6  )-----------( 460      ( 19 Mar 2018 07:03 )             43.4     03-19    135  |  97  |  14  ----------------------------<  95  4.0   |  24  |  0.87    Ca    10.0      19 Mar 2018 07:03    TPro  8.1  /  Alb  4.0  /  TBili  0.4  /  DBili  x   /  AST  28  /  ALT  49<H>  /  AlkPhos  93  03-19    LIVER FUNCTIONS - ( 19 Mar 2018 07:03 )  Alb: 4.0 g/dL / Pro: 8.1 g/dL / ALK PHOS: 93 U/L / ALT: 49 U/L RC / AST: 28 U/L / GGT: x             MICROBIOLOGY:  RECENT CULTURES:  03-14 @ 14:21 .Surgical Swab 2. wound abscess left ankle Staphylococcus aureus    Few Staphylococcus aureus      03-14 @ 14:05 .Surgical Swab 3. wound abscess left ankle     No growth at 5 days      03-14 @ 13:55 .Surgical Swab 1. wound abscess left ankle Staphylococcus aureus    Rare Staphylococcus aureus            RADIOLOGY REVIEWED:

## 2018-03-19 NOTE — PROGRESS NOTE ADULT - SUBJECTIVE AND OBJECTIVE BOX
Patient seen and examined. Pain controlled. No acute events overnight    HEALTH ISSUES - PROBLEM Dx:  Gout: Gout  Essential hypertension: Essential hypertension  Surgical site infection: Surgical site infection        MEDICATIONS  (STANDING):  acetaminophen  IVPB. 1000 milliGRAM(s) IV Intermittent once  ascorbic acid 500 milliGRAM(s) Oral daily  aspirin enteric coated 325 milliGRAM(s) Oral daily  ceFAZolin   IVPB 1000 milliGRAM(s) IV Intermittent every 8 hours  docusate sodium 100 milliGRAM(s) Oral three times a day  famotidine    Tablet 20 milliGRAM(s) Oral daily  hydrochlorothiazide 12.5 milliGRAM(s) Oral daily  ketorolac   Injectable 15 milliGRAM(s) IV Push once  lactated ringers. 1000 milliLiter(s) IV Continuous <Continuous>  lisinopril 20 milliGRAM(s) Oral daily  multivitamin 1 Tablet(s) Oral daily  senna 2 Tablet(s) Oral at bedtime    Allergies    No Known Allergies          Vital Signs Last 24 Hrs  T(C): 36.7 (03-19-18 @ 01:35), Max: 37.3 (03-18-18 @ 14:15)  T(F): 98 (03-19-18 @ 01:35), Max: 99.1 (03-18-18 @ 14:15)  HR: 67 (03-19-18 @ 01:35) (66 - 92)  BP: 107/72 (03-19-18 @ 01:35) (104/70 - 120/80)  BP(mean): --  RR: 18 (03-19-18 @ 01:35) (18 - 18)  SpO2: 97% (03-19-18 @ 01:35) (95% - 97%)    Physical Exam  Gen: NAD  LLE:  wound vac in place, dressing c/d  +ehl/fhl/ta/gs function  L2-S1 silt  Dp/pt pulse intact  No calf ttp  Compartments soft    A/P: 37y Male sp L ankle I&D  Pain control  DVT ppx  NWB  FU labs  Ice/elevate  Medical management appreciated  Incentive spirometry  Dispo planning Patient seen and examined. Pain controlled. No acute events overnight    HEALTH ISSUES - PROBLEM Dx:  Gout: Gout  Essential hypertension: Essential hypertension  Surgical site infection: Surgical site infection        MEDICATIONS  (STANDING):  acetaminophen  IVPB. 1000 milliGRAM(s) IV Intermittent once  ascorbic acid 500 milliGRAM(s) Oral daily  aspirin enteric coated 325 milliGRAM(s) Oral daily  ceFAZolin   IVPB 1000 milliGRAM(s) IV Intermittent every 8 hours  docusate sodium 100 milliGRAM(s) Oral three times a day  famotidine    Tablet 20 milliGRAM(s) Oral daily  hydrochlorothiazide 12.5 milliGRAM(s) Oral daily  ketorolac   Injectable 15 milliGRAM(s) IV Push once  lactated ringers. 1000 milliLiter(s) IV Continuous <Continuous>  lisinopril 20 milliGRAM(s) Oral daily  multivitamin 1 Tablet(s) Oral daily  senna 2 Tablet(s) Oral at bedtime    Allergies    No Known Allergies          Vital Signs Last 24 Hrs  T(C): 36.7 (03-19-18 @ 01:35), Max: 37.3 (03-18-18 @ 14:15)  T(F): 98 (03-19-18 @ 01:35), Max: 99.1 (03-18-18 @ 14:15)  HR: 67 (03-19-18 @ 01:35) (66 - 92)  BP: 107/72 (03-19-18 @ 01:35) (104/70 - 120/80)  BP(mean): --  RR: 18 (03-19-18 @ 01:35) (18 - 18)  SpO2: 97% (03-19-18 @ 01:35) (95% - 97%)    Physical Exam  Gen: NAD  LLE:  wound vac in place, dressing c/d  +ehl/fhl/ta/gs function  L2-S1 silt  Dp/pt pulse intact  No calf ttp  Compartments soft    A/P: 37y Male sp L ankle I&D  Pain control  DVT ppx  NWB, Cam boot immobilization  FU labs  Ice/elevate  Medical management appreciated  Incentive spirometry  Wound VAC care  Antibiotics per ID consultation  Dispo planning and outpatient follow-up in 5 - 7 days / PRN.

## 2018-03-19 NOTE — PROGRESS NOTE ADULT - ASSESSMENT
37y male with PMH significant for HTN   Admitted to NS on 1/07/18 post fall & found to have bimalleolar fracture of the ankle.   S/p ORIF on 01/12/18.   On 3/02/18 he noted that left ankle was unusually painful, followed by redness & swelling, furthermore, several days later incision dehisced slightly & pus came out  Started on Keflex on 3/09/18  Seen by Dr. Loya on 3/12/18 & hospitalized for concerns of infected hardware  Redness of ankle & edema had improved remarkably on Keflex even PTA  S/p I & D of left ankle & MARIA ELENA on 3/14/18  OR findings reviewed - no pus noted - 3 sinus tracts noted  OR cx recovered MSSA   Case was reviewed with Dr. Loya - no concern for osteomyelitis in OR. Fracture had healed & hardware was removed. Bone submitted for path. He favors a 3 wks course of PO antibiotics for deep soft tissue infection. He can stay on IV cefazolin as inpatient, changing it to PO Keflex 1 gram TID upon d/c - Today is D # 6/21    Plan:   Continue IV Cefazolin.  Follow pathology from OR  Patient will follow up with Dr. Loya office in a week, we will remain available for any questions or concerns. Office contact info provided to the patient.

## 2018-03-19 NOTE — PROGRESS NOTE ADULT - PROBLEM SELECTOR PLAN 1
tolerated procedure  pain meds as needed
Tolerated surgery well  Incentive spirometry   DVT and GI prophylaxis
tolerated procedure  pain meds as needed  Incentive spirometry
tolerated procedure  pain meds as needed  Incentive spirometry  Wound vac in place  Patient self removed splint by himdelf  Ortho to re-evaluate
tolerated procedure  pain meds as needed  Incentive spirometry  Wound vac in place
tolerated procedure  pain meds as needed  Incentive spirometry  Wound vac in place  Patient self removed splint by himdelf  Ortho to re-evaluate

## 2018-03-19 NOTE — PROGRESS NOTE ADULT - ASSESSMENT
38 yo male s/p removal of infected hardware with wound vac in place.  Patient removed splint on the left leg, VAC in place

## 2018-03-19 NOTE — PROGRESS NOTE ADULT - PROBLEM SELECTOR PLAN 2
BP under control  Low salt diet  adjust meds as needed
Bp under control  Low salt diet
BP under control  Low salt diet  adjust meds as needed
BP under control  Low salt diet  adjust meds as needed

## 2018-03-19 NOTE — PROGRESS NOTE ADULT - ATTENDING COMMENTS
Patient dced home  continue current meds   Follow up with ortho  PO as tolerated  activity as per ortho   Patient aware of plan

## 2018-03-19 NOTE — PROGRESS NOTE ADULT - SUBJECTIVE AND OBJECTIVE BOX
Patient is a 37y old  Male who presents with a chief complaint of Infection of L foot surgical site (16 Mar 2018 09:05)      HPI:  C/o pain from splint.  Patient removed cast and now is hopping on good leg with walker  No fever or chills.  Ortho to re-evaluate casting of the left foot.    MEDICATIONS  (STANDING):  acetaminophen  IVPB. 1000 milliGRAM(s) IV Intermittent once  ascorbic acid 500 milliGRAM(s) Oral daily  aspirin enteric coated 325 milliGRAM(s) Oral daily  ceFAZolin   IVPB 1000 milliGRAM(s) IV Intermittent every 8 hours  docusate sodium 100 milliGRAM(s) Oral three times a day  famotidine    Tablet 20 milliGRAM(s) Oral daily  hydrochlorothiazide 12.5 milliGRAM(s) Oral daily  ketorolac   Injectable 15 milliGRAM(s) IV Push once  lactated ringers. 1000 milliLiter(s) (100 mL/Hr) IV Continuous <Continuous>  lisinopril 20 milliGRAM(s) Oral daily  multivitamin 1 Tablet(s) Oral daily  senna 2 Tablet(s) Oral at bedtime    MEDICATIONS  (PRN):  acetaminophen   Tablet 650 milliGRAM(s) Oral every 6 hours PRN For Temp greater than 38 C (100.4 F)  HYDROmorphone  Injectable 1 milliGRAM(s) IV Push every 4 hours PRN breakthrough  ondansetron Injectable 4 milliGRAM(s) IV Push every 6 hours PRN Nausea and/or Vomiting  oxyCODONE    IR 5 milliGRAM(s) Oral every 3 hours PRN Moderate Pain (4 - 6)  oxyCODONE    IR 10 milliGRAM(s) Oral every 3 hours PRN Severe Pain (7 - 10)  zolpidem 5 milliGRAM(s) Oral at bedtime PRN Insomnia      Allergies    No Known Allergies    Intolerances        VITALS:   T(C): 37.2 (03-18-18 @ 17:13), Max: 37.3 (03-18-18 @ 14:15)  HR: 89 (03-18-18 @ 17:13) (66 - 89)  BP: 119/77 (03-18-18 @ 17:13) (104/70 - 121/80)  RR: 18 (03-18-18 @ 17:13) (18 - 18)  SpO2: 95% (03-18-18 @ 17:13) (95% - 98%)  Wt(kg): --    03-17 @ 07:01  -  03-18 @ 07:00  --------------------------------------------------------  IN: 1190 mL / OUT: 0 mL / NET: 1190 mL    03-18 @ 07:01 - 03-18 @ 18:46  --------------------------------------------------------  IN: 750 mL / OUT: 0 mL / NET: 750 mL        PHYSICAL EXAM:  GENERAL: NAD, well nourished and conversant  HEAD:  Atraumatic  EYES: EOM, PERRLA, conjunctiva pink and sclera white  ENT: No tonsillar erythema, exudates, or enlargement, moist mucous membranes, good dentition, no lesions  NECK: Supple, No JVD, normal thyroid, carotids with normal upstrokes and no bruits  CHEST/LUNG: Clear to auscultation bilaterally, No rales, rhonchi, wheezing, or rubs  HEART: Regular rate and rhythm, No murmurs, rubs, or gallops  ABDOMEN: Soft, nondistended, no masses, guarding, tenderness .  Repair of ankel fracture with MARIA ELENA and VAC dressing in p[lace  LYMPH: No lymphadenopathy noted  SKIN: No rashes or lesions  NERVOUS SYSTEM:  Alert & Oriented X3, normal cognitive function. Motor Strength 5/5 right upper and right lower.  5/5 left upper and left lower extremities, DTRs 2+ intact and symmetric

## 2018-03-19 NOTE — PROGRESS NOTE ADULT - PROVIDER SPECIALTY LIST ADULT
Infectious Disease
Internal Medicine
Orthopedics
Internal Medicine

## 2018-03-19 NOTE — PROGRESS NOTE ADULT - PROBLEM SELECTOR PROBLEM 1
Surgical site infection

## 2018-03-26 ENCOUNTER — APPOINTMENT (OUTPATIENT)
Dept: ORTHOPEDIC SURGERY | Facility: CLINIC | Age: 38
End: 2018-03-26
Payer: COMMERCIAL

## 2018-03-26 PROCEDURE — 99024 POSTOP FOLLOW-UP VISIT: CPT

## 2018-03-26 PROCEDURE — 73610 X-RAY EXAM OF ANKLE: CPT | Mod: LT

## 2018-03-29 ENCOUNTER — APPOINTMENT (OUTPATIENT)
Dept: ORTHOPEDIC SURGERY | Facility: HOSPITAL | Age: 38
End: 2018-03-29

## 2018-03-30 ENCOUNTER — APPOINTMENT (OUTPATIENT)
Dept: ORTHOPEDIC SURGERY | Facility: CLINIC | Age: 38
End: 2018-03-30
Payer: COMMERCIAL

## 2018-03-30 PROCEDURE — 99024 POSTOP FOLLOW-UP VISIT: CPT

## 2018-04-04 ENCOUNTER — APPOINTMENT (OUTPATIENT)
Dept: ORTHOPEDIC SURGERY | Facility: CLINIC | Age: 38
End: 2018-04-04
Payer: COMMERCIAL

## 2018-04-04 PROCEDURE — 99024 POSTOP FOLLOW-UP VISIT: CPT

## 2018-04-09 ENCOUNTER — APPOINTMENT (OUTPATIENT)
Dept: ORTHOPEDIC SURGERY | Facility: CLINIC | Age: 38
End: 2018-04-09

## 2018-04-14 ENCOUNTER — APPOINTMENT (OUTPATIENT)
Dept: INTERNAL MEDICINE | Facility: CLINIC | Age: 38
End: 2018-04-14

## 2018-04-16 ENCOUNTER — APPOINTMENT (OUTPATIENT)
Dept: ORTHOPEDIC SURGERY | Facility: CLINIC | Age: 38
End: 2018-04-16
Payer: COMMERCIAL

## 2018-04-16 PROCEDURE — 73610 X-RAY EXAM OF ANKLE: CPT | Mod: LT

## 2018-04-16 PROCEDURE — 99024 POSTOP FOLLOW-UP VISIT: CPT

## 2018-05-14 ENCOUNTER — APPOINTMENT (OUTPATIENT)
Dept: ORTHOPEDIC SURGERY | Facility: CLINIC | Age: 38
End: 2018-05-14
Payer: COMMERCIAL

## 2018-05-14 PROCEDURE — 99024 POSTOP FOLLOW-UP VISIT: CPT

## 2018-06-09 ENCOUNTER — APPOINTMENT (OUTPATIENT)
Dept: INTERNAL MEDICINE | Facility: CLINIC | Age: 38
End: 2018-06-09
Payer: COMMERCIAL

## 2018-06-09 VITALS
BODY MASS INDEX: 27.89 KG/M2 | WEIGHT: 184 LBS | HEIGHT: 68 IN | SYSTOLIC BLOOD PRESSURE: 120 MMHG | DIASTOLIC BLOOD PRESSURE: 84 MMHG

## 2018-06-09 DIAGNOSIS — Z87.81 PERSONAL HISTORY OF (HEALED) TRAUMATIC FRACTURE: ICD-10-CM

## 2018-06-09 PROCEDURE — 36415 COLL VENOUS BLD VENIPUNCTURE: CPT

## 2018-06-09 PROCEDURE — 93000 ELECTROCARDIOGRAM COMPLETE: CPT

## 2018-06-09 PROCEDURE — 94010 BREATHING CAPACITY TEST: CPT

## 2018-06-09 PROCEDURE — 99395 PREV VISIT EST AGE 18-39: CPT | Mod: 25

## 2018-06-09 RX ORDER — TRIAMCINOLONE ACETONIDE 1 MG/G
0.1 OINTMENT TOPICAL TWICE DAILY
Qty: 1 | Refills: 5 | Status: ACTIVE | COMMUNITY
Start: 2018-06-09 | End: 1900-01-01

## 2018-06-09 NOTE — COUNSELING
[Weight management counseling provided] : Weight management [Activity counseling provided] : activity [Target Wt Loss Goal ___] : Target weight loss goal [unfilled] lbs [___ min/wk activity recommended] : [unfilled] min/wk activity recommended

## 2018-06-09 NOTE — HISTORY OF PRESENT ILLNESS
[FreeTextEntry1] : Hypertension, elevated LFTs, and gout [de-identified] : Ankle is slowly improving.  Going to physical therapy at this time.

## 2018-06-09 NOTE — ASSESSMENT
[FreeTextEntry1] : Patient understands importance of weight reduction.\par Once left ankle is completely healed and cleared by surgeon, patient will start regular exercise program.\par Uric acid.  Want uric acid<6.5. No recent gout attack.  Patient aware of foods affecting uric acid level.\par LFT.  iron,etc.\par Continue same blood pressure medication.  Follow blood pressure.\par

## 2018-06-09 NOTE — PHYSICAL EXAM
[No Acute Distress] : no acute distress [Well Nourished] : well nourished [Well Developed] : well developed [Well-Appearing] : well-appearing [Normal Outer Ear/Nose] : the outer ears and nose were normal in appearance [Supple] : supple [No Respiratory Distress] : no respiratory distress  [Clear to Auscultation] : lungs were clear to auscultation bilaterally [Normal Rate] : normal rate  [Regular Rhythm] : with a regular rhythm [No Edema] : there was no peripheral edema [Soft] : abdomen soft [Non Tender] : non-tender [Testes Mass (___cm)] : there were no testicular masses [Normal Posterior Cervical Nodes] : no posterior cervical lymphadenopathy [Normal Anterior Cervical Nodes] : no anterior cervical lymphadenopathy [No CVA Tenderness] : no CVA  tenderness [No Spinal Tenderness] : no spinal tenderness [Cyanosis] : no cyanosis [Abnormal Temperature] : normal temperature [Normal Affect] : the affect was normal [Normal Mood] : the mood was normal

## 2018-06-11 LAB
ALBUMIN MFR SERPL ELPH: 61.8 %
ALBUMIN SERPL ELPH-MCNC: 4.5 G/DL
ALBUMIN SERPL-MCNC: 4.9 G/DL
ALBUMIN/GLOB SERPL: 1.6 RATIO
ALP BLD-CCNC: 96 U/L
ALPHA1 GLOB MFR SERPL ELPH: 3 %
ALPHA1 GLOB SERPL ELPH-MCNC: 0.2 G/DL
ALPHA2 GLOB MFR SERPL ELPH: 7.2 %
ALPHA2 GLOB SERPL ELPH-MCNC: 0.6 G/DL
ALT SERPL-CCNC: 73 U/L
ANION GAP SERPL CALC-SCNC: 13 MMOL/L
AST SERPL-CCNC: 40 U/L
B-GLOBULIN MFR SERPL ELPH: 12.8 %
B-GLOBULIN SERPL ELPH-MCNC: 1 G/DL
BASOPHILS # BLD AUTO: 0.06 K/UL
BASOPHILS NFR BLD AUTO: 0.6 %
BILIRUB SERPL-MCNC: 0.6 MG/DL
BUN SERPL-MCNC: 15 MG/DL
CALCIUM SERPL-MCNC: 10 MG/DL
CERULOPLASMIN SERPL-MCNC: 26 MG/DL
CHLORIDE SERPL-SCNC: 101 MMOL/L
CHOLEST SERPL-MCNC: 235 MG/DL
CHOLEST/HDLC SERPL: 4.1 RATIO
CO2 SERPL-SCNC: 25 MMOL/L
CREAT SERPL-MCNC: 0.98 MG/DL
EOSINOPHIL # BLD AUTO: 0.7 K/UL
EOSINOPHIL NFR BLD AUTO: 7.1 %
GAMMA GLOB FLD ELPH-MCNC: 1.2 G/DL
GAMMA GLOB MFR SERPL ELPH: 15.2 %
GLUCOSE SERPL-MCNC: 91 MG/DL
HCT VFR BLD CALC: 46.7 %
HDLC SERPL-MCNC: 57 MG/DL
HGB BLD-MCNC: 15.3 G/DL
IMM GRANULOCYTES NFR BLD AUTO: 0.3 %
INTERPRETATION SERPL IEP-IMP: NORMAL
IRON SATN MFR SERPL: 35 %
IRON SERPL-MCNC: 104 UG/DL
LDLC SERPL CALC-MCNC: 145 MG/DL
LYMPHOCYTES # BLD AUTO: 4.12 K/UL
LYMPHOCYTES NFR BLD AUTO: 41.5 %
MAN DIFF?: NORMAL
MCHC RBC-ENTMCNC: 28.4 PG
MCHC RBC-ENTMCNC: 32.8 GM/DL
MCV RBC AUTO: 86.8 FL
MONOCYTES # BLD AUTO: 0.72 K/UL
MONOCYTES NFR BLD AUTO: 7.3 %
NEUTROPHILS # BLD AUTO: 4.29 K/UL
NEUTROPHILS NFR BLD AUTO: 43.2 %
PLATELET # BLD AUTO: 326 K/UL
POTASSIUM SERPL-SCNC: 4.2 MMOL/L
PROT SERPL-MCNC: 8 G/DL
RBC # BLD: 5.38 M/UL
RBC # FLD: 13.6 %
SODIUM SERPL-SCNC: 139 MMOL/L
TIBC SERPL-MCNC: 301 UG/DL
TRIGL SERPL-MCNC: 166 MG/DL
TSH SERPL-ACNC: 1.91 UIU/ML
UIBC SERPL-MCNC: 197 UG/DL
URATE SERPL-MCNC: 11.8 MG/DL
WBC # FLD AUTO: 9.92 K/UL

## 2018-07-09 ENCOUNTER — APPOINTMENT (OUTPATIENT)
Dept: ORTHOPEDIC SURGERY | Facility: CLINIC | Age: 38
End: 2018-07-09

## 2018-07-16 PROBLEM — I10 ESSENTIAL (PRIMARY) HYPERTENSION: Chronic | Status: ACTIVE | Noted: 2018-01-07

## 2018-07-27 ENCOUNTER — APPOINTMENT (OUTPATIENT)
Dept: PEDIATRIC ALLERGY IMMUNOLOGY | Facility: CLINIC | Age: 38
End: 2018-07-27
Payer: COMMERCIAL

## 2018-07-27 VITALS
WEIGHT: 185 LBS | BODY MASS INDEX: 28.04 KG/M2 | HEIGHT: 68 IN | DIASTOLIC BLOOD PRESSURE: 93 MMHG | SYSTOLIC BLOOD PRESSURE: 144 MMHG | OXYGEN SATURATION: 95 % | HEART RATE: 77 BPM

## 2018-07-27 PROCEDURE — 95004 PERQ TESTS W/ALRGNC XTRCS: CPT

## 2018-07-27 PROCEDURE — 99244 OFF/OP CNSLTJ NEW/EST MOD 40: CPT | Mod: 25

## 2018-08-13 ENCOUNTER — APPOINTMENT (OUTPATIENT)
Dept: ORTHOPEDIC SURGERY | Facility: CLINIC | Age: 38
End: 2018-08-13
Payer: COMMERCIAL

## 2018-08-13 PROCEDURE — 73610 X-RAY EXAM OF ANKLE: CPT | Mod: LT

## 2018-08-13 PROCEDURE — 99213 OFFICE O/P EST LOW 20 MIN: CPT

## 2018-08-14 PROBLEM — M10.9 GOUT, UNSPECIFIED: Chronic | Status: ACTIVE | Noted: 2018-01-10

## 2018-08-14 PROBLEM — K76.0 FATTY (CHANGE OF) LIVER, NOT ELSEWHERE CLASSIFIED: Chronic | Status: ACTIVE | Noted: 2018-01-10

## 2018-09-25 ENCOUNTER — APPOINTMENT (OUTPATIENT)
Dept: ORTHOPEDIC SURGERY | Facility: CLINIC | Age: 38
End: 2018-09-25
Payer: COMMERCIAL

## 2018-09-25 PROCEDURE — 73610 X-RAY EXAM OF ANKLE: CPT | Mod: LT

## 2018-09-25 PROCEDURE — 99214 OFFICE O/P EST MOD 30 MIN: CPT

## 2018-09-28 ENCOUNTER — APPOINTMENT (OUTPATIENT)
Dept: INTERNAL MEDICINE | Facility: CLINIC | Age: 38
End: 2018-09-28
Payer: COMMERCIAL

## 2018-09-28 PROCEDURE — 90686 IIV4 VACC NO PRSV 0.5 ML IM: CPT

## 2018-09-28 PROCEDURE — G0008: CPT

## 2018-10-17 ENCOUNTER — RX RENEWAL (OUTPATIENT)
Age: 38
End: 2018-10-17

## 2018-12-10 ENCOUNTER — APPOINTMENT (OUTPATIENT)
Dept: ORTHOPEDIC SURGERY | Facility: CLINIC | Age: 38
End: 2018-12-10

## 2018-12-28 NOTE — ED ADULT NURSE NOTE - CAS TRG GENERAL AIRWAY, MLM
December 28, 2018      TO: Lexus Uribe  [address]  [address]  [facility] [address]         To Whom it may concern,      Lexus Uribe is a patient of Dr. Robledo at the [facility]. Lexus was last seen by provider in clinic on 10/11/18 for diagnosis of MDD, recurrent, in partial remission  and Anxiety which has been interfering with school. Please call clinic at [phone] for further questions.          Sincerely,      Bret Robledo MD        [employer]

## 2019-01-12 ENCOUNTER — RX RENEWAL (OUTPATIENT)
Age: 39
End: 2019-01-12

## 2019-01-14 ENCOUNTER — APPOINTMENT (OUTPATIENT)
Dept: ORTHOPEDIC SURGERY | Facility: CLINIC | Age: 39
End: 2019-01-14

## 2019-01-21 ENCOUNTER — RESULT CHARGE (OUTPATIENT)
Age: 39
End: 2019-01-21

## 2019-01-31 ENCOUNTER — APPOINTMENT (OUTPATIENT)
Dept: ORTHOPEDIC SURGERY | Facility: CLINIC | Age: 39
End: 2019-01-31
Payer: COMMERCIAL

## 2019-01-31 PROCEDURE — 99214 OFFICE O/P EST MOD 30 MIN: CPT

## 2019-01-31 PROCEDURE — 73610 X-RAY EXAM OF ANKLE: CPT | Mod: LT

## 2019-02-01 NOTE — DISCUSSION/SUMMARY
[de-identified] : NB shoe wear, activity as tolerated, ankle rehabilitation and HEP.  Return to office PRN, all questions answered.

## 2019-02-01 NOTE — PHYSICAL EXAM
[Normal RUE] : Right Upper Extremity: No scars, rashes, lesions, ulcers, skin intact [Normal LUE] : Left Upper Extremity: No scars, rashes, lesions, ulcers, skin intact [Normal RLE] : Right Lower Extremity: No scars, rashes, lesions, ulcers, skin intact [Normal DTR Reflexes] : DTR reflexes normal [Normal Proprioception] : proprioception [Normal] : Oriented to person, place, and time, insight and judgement were intact and the affect was normal [de-identified] : Extremity: lateral wound well healed L ankle.  Nontender L ankle distal fibula, peroneals, anterolateral aspect of ankle, syndesmosis, medial malleolus, Deltoid, hindfoot ST.  Good range of motion / painless L ankle and ST joints, 5 / 5 evertor and invertor strength L ankle, stable Drawer testing L ankle, calves soft and nontender, sensorimotor unchanged L LE.  AOx3, mood / affect normal. [de-identified] : Sensorimotor normal B UE and LE (save for aforementioned) [de-identified] : GEORGE, NAD [de-identified] : Radiographs (3v L ankle) reveal good alignment, healed fracture L ankle.

## 2019-02-01 NOTE — HISTORY OF PRESENT ILLNESS
[Other: ____] : [unfilled] [FreeTextEntry1] : Follow-up status-post I&D, removal of hardware and wound VAC application x 3/14/18 for wound infection L ankle. Pt reports doing well since last visit, walking 8 - 10 miles per day. Pt still has intermittent pain. Pt denies any numbness/tingling. Denies additional musculoskeletal complaints referable to foot/ankle. Pt denies any other new symptoms/injuries to left foot/ankle.

## 2019-02-09 ENCOUNTER — LABORATORY RESULT (OUTPATIENT)
Age: 39
End: 2019-02-09

## 2019-02-23 ENCOUNTER — APPOINTMENT (OUTPATIENT)
Dept: INTERNAL MEDICINE | Facility: CLINIC | Age: 39
End: 2019-02-23
Payer: COMMERCIAL

## 2019-02-23 VITALS
OXYGEN SATURATION: 97 % | HEIGHT: 68 IN | HEART RATE: 68 BPM | BODY MASS INDEX: 28.04 KG/M2 | SYSTOLIC BLOOD PRESSURE: 122 MMHG | DIASTOLIC BLOOD PRESSURE: 80 MMHG | WEIGHT: 185 LBS | TEMPERATURE: 97.6 F

## 2019-02-23 PROCEDURE — 99214 OFFICE O/P EST MOD 30 MIN: CPT

## 2019-02-23 NOTE — HISTORY OF PRESENT ILLNESS
[FreeTextEntry1] : gout, Hypertension, hypercholesterol, and elevated LFTs [de-identified] : no complaints\par

## 2019-02-23 NOTE — ASSESSMENT
[FreeTextEntry1] : Continue same blood pressure medication.  Follow blood pressure.  Check BP at [facility].\par Follow lipid\par Uric acid is high. No history of gout attack. Follow uric acid.\par Follow LFT

## 2019-04-09 ENCOUNTER — RX RENEWAL (OUTPATIENT)
Age: 39
End: 2019-04-09

## 2019-07-01 ENCOUNTER — RX RENEWAL (OUTPATIENT)
Age: 39
End: 2019-07-01

## 2019-07-02 ENCOUNTER — RX RENEWAL (OUTPATIENT)
Age: 39
End: 2019-07-02

## 2019-07-25 ENCOUNTER — RX RENEWAL (OUTPATIENT)
Age: 39
End: 2019-07-25

## 2019-07-27 ENCOUNTER — TRANSCRIPTION ENCOUNTER (OUTPATIENT)
Age: 39
End: 2019-07-27

## 2019-07-27 ENCOUNTER — RX RENEWAL (OUTPATIENT)
Age: 39
End: 2019-07-27

## 2019-08-21 ENCOUNTER — RX RENEWAL (OUTPATIENT)
Age: 39
End: 2019-08-21

## 2019-09-30 ENCOUNTER — RX RENEWAL (OUTPATIENT)
Age: 39
End: 2019-09-30

## 2019-10-27 ENCOUNTER — RX RENEWAL (OUTPATIENT)
Age: 39
End: 2019-10-27

## 2019-12-09 ENCOUNTER — RX RENEWAL (OUTPATIENT)
Age: 39
End: 2019-12-09

## 2020-01-13 ENCOUNTER — LABORATORY RESULT (OUTPATIENT)
Age: 40
End: 2020-01-13

## 2020-01-23 ENCOUNTER — APPOINTMENT (OUTPATIENT)
Dept: INTERNAL MEDICINE | Facility: CLINIC | Age: 40
End: 2020-01-23
Payer: COMMERCIAL

## 2020-01-23 VITALS
WEIGHT: 174 LBS | SYSTOLIC BLOOD PRESSURE: 120 MMHG | DIASTOLIC BLOOD PRESSURE: 80 MMHG | BODY MASS INDEX: 26.37 KG/M2 | TEMPERATURE: 97.7 F | HEIGHT: 68 IN

## 2020-01-23 PROCEDURE — G0008: CPT

## 2020-01-23 PROCEDURE — 90686 IIV4 VACC NO PRSV 0.5 ML IM: CPT

## 2020-01-23 PROCEDURE — 93000 ELECTROCARDIOGRAM COMPLETE: CPT

## 2020-01-23 PROCEDURE — 99395 PREV VISIT EST AGE 18-39: CPT | Mod: 25

## 2020-01-23 PROCEDURE — 94010 BREATHING CAPACITY TEST: CPT

## 2020-01-23 NOTE — ASSESSMENT
[FreeTextEntry1] : lost 10 lbs through intermittent fasting.\par Hypertension-with elevated uric acid, will stop lisinopril-HCTZ.  Start losartan 50 qd.  follow BP\par follow LFT\par follow A1C\par follow uric acid

## 2020-01-23 NOTE — PHYSICAL EXAM
[No Acute Distress] : no acute distress [Well Developed] : well developed [Well Nourished] : well nourished [Well-Appearing] : well-appearing [Normal Outer Ear/Nose] : the outer ears and nose were normal in appearance [Supple] : supple [No Respiratory Distress] : no respiratory distress  [No Accessory Muscle Use] : no accessory muscle use [Clear to Auscultation] : lungs were clear to auscultation bilaterally [Normal Rate] : normal rate  [Regular Rhythm] : with a regular rhythm [No Edema] : there was no peripheral edema [Soft] : abdomen soft [Non Tender] : non-tender [Testes Mass (___cm)] : there were no testicular masses [Normal Supraclavicular Nodes] : no supraclavicular lymphadenopathy [Normal Posterior Cervical Nodes] : no posterior cervical lymphadenopathy [Normal Anterior Cervical Nodes] : no anterior cervical lymphadenopathy [No Spinal Tenderness] : no spinal tenderness [No CVA Tenderness] : no CVA  tenderness [Coordination Grossly Intact] : coordination grossly intact [Normal Gait] : normal gait [Speech Grossly Normal] : speech grossly normal [Normal Affect] : the affect was normal [Normal Mood] : the mood was normal

## 2020-02-13 ENCOUNTER — RX RENEWAL (OUTPATIENT)
Age: 40
End: 2020-02-13

## 2020-02-22 ENCOUNTER — APPOINTMENT (OUTPATIENT)
Dept: INTERNAL MEDICINE | Facility: CLINIC | Age: 40
End: 2020-02-22
Payer: COMMERCIAL

## 2020-02-22 VITALS
SYSTOLIC BLOOD PRESSURE: 130 MMHG | DIASTOLIC BLOOD PRESSURE: 90 MMHG | HEIGHT: 68 IN | BODY MASS INDEX: 25.91 KG/M2 | WEIGHT: 171 LBS

## 2020-02-22 PROCEDURE — 36415 COLL VENOUS BLD VENIPUNCTURE: CPT

## 2020-02-22 PROCEDURE — 99214 OFFICE O/P EST MOD 30 MIN: CPT | Mod: 25

## 2020-02-22 NOTE — ASSESSMENT
[FreeTextEntry1] : losing weight through intermittent fasting\par BP at home=130/75.  Repeat BP in office=138/84.  cont. losartan 50 mg qd\par follow uric acid\par follow A1C and lipid

## 2020-02-22 NOTE — PHYSICAL EXAM
[No Acute Distress] : no acute distress [Well Nourished] : well nourished [Well Developed] : well developed [Well-Appearing] : well-appearing [Normal Outer Ear/Nose] : the outer ears and nose were normal in appearance [Supple] : supple [No Respiratory Distress] : no respiratory distress  [No Accessory Muscle Use] : no accessory muscle use [Clear to Auscultation] : lungs were clear to auscultation bilaterally [Normal Rate] : normal rate  [No Edema] : there was no peripheral edema [Regular Rhythm] : with a regular rhythm [Cyanosis] : no cyanosis [No Spinal Tenderness] : no spinal tenderness [No CVA Tenderness] : no CVA  tenderness [Abnormal Temperature] : normal temperature [Coordination Grossly Intact] : coordination grossly intact [Normal Gait] : normal gait [Speech Grossly Normal] : speech grossly normal [Normal Mood] : the mood was normal [Normal Affect] : the affect was normal

## 2020-02-25 LAB
ALBUMIN SERPL ELPH-MCNC: 4.8 G/DL
ALP BLD-CCNC: 71 U/L
ALT SERPL-CCNC: 18 U/L
ANION GAP SERPL CALC-SCNC: 11 MMOL/L
AST SERPL-CCNC: 22 U/L
BILIRUB SERPL-MCNC: 0.6 MG/DL
BUN SERPL-MCNC: 13 MG/DL
CALCIUM SERPL-MCNC: 9.7 MG/DL
CHLORIDE SERPL-SCNC: 104 MMOL/L
CO2 SERPL-SCNC: 26 MMOL/L
CREAT SERPL-MCNC: 0.95 MG/DL
GLUCOSE SERPL-MCNC: 98 MG/DL
POTASSIUM SERPL-SCNC: 4.1 MMOL/L
PROT SERPL-MCNC: 7.5 G/DL
SAVE SPECIMEN: NORMAL
SODIUM SERPL-SCNC: 141 MMOL/L

## 2020-02-26 ENCOUNTER — APPOINTMENT (OUTPATIENT)
Dept: INTERNAL MEDICINE | Facility: CLINIC | Age: 40
End: 2020-02-26
Payer: COMMERCIAL

## 2020-02-26 VITALS
TEMPERATURE: 97.2 F | SYSTOLIC BLOOD PRESSURE: 142 MMHG | OXYGEN SATURATION: 98 % | BODY MASS INDEX: 25.76 KG/M2 | DIASTOLIC BLOOD PRESSURE: 100 MMHG | HEART RATE: 72 BPM | HEIGHT: 68 IN | WEIGHT: 170 LBS

## 2020-02-26 PROCEDURE — 99214 OFFICE O/P EST MOD 30 MIN: CPT

## 2020-02-26 NOTE — PHYSICAL EXAM
[No Acute Distress] : no acute distress [Well Nourished] : well nourished [Well Developed] : well developed [Well-Appearing] : well-appearing [Normal Outer Ear/Nose] : the outer ears and nose were normal in appearance [Supple] : supple [No Accessory Muscle Use] : no accessory muscle use [No Respiratory Distress] : no respiratory distress  [Clear to Auscultation] : lungs were clear to auscultation bilaterally [Normal Rate] : normal rate  [No Edema] : there was no peripheral edema [Soft] : abdomen soft [Regular Rhythm] : with a regular rhythm [No Spinal Tenderness] : no spinal tenderness [No CVA Tenderness] : no CVA  tenderness [Cyanosis] : no cyanosis [Normal Gait] : normal gait [Abnormal Temperature] : normal temperature [Coordination Grossly Intact] : coordination grossly intact [Speech Grossly Normal] : speech grossly normal [Normal Affect] : the affect was normal [Normal Mood] : the mood was normal

## 2020-02-26 NOTE — ASSESSMENT
[FreeTextEntry1] : repeat MY=011/94.  cont. losartan 50 qd.  Add norvasc 2.5 qd.\par follow lipid\par follow LFT \par follow uric acid

## 2020-02-27 ENCOUNTER — APPOINTMENT (OUTPATIENT)
Dept: OTOLARYNGOLOGY | Facility: CLINIC | Age: 40
End: 2020-02-27

## 2020-03-26 ENCOUNTER — APPOINTMENT (OUTPATIENT)
Dept: INTERNAL MEDICINE | Facility: CLINIC | Age: 40
End: 2020-03-26

## 2020-05-08 ENCOUNTER — APPOINTMENT (OUTPATIENT)
Dept: INTERNAL MEDICINE | Facility: CLINIC | Age: 40
End: 2020-05-08
Payer: COMMERCIAL

## 2020-05-08 VITALS
TEMPERATURE: 97.8 F | OXYGEN SATURATION: 97 % | HEART RATE: 85 BPM | HEIGHT: 68 IN | SYSTOLIC BLOOD PRESSURE: 140 MMHG | DIASTOLIC BLOOD PRESSURE: 100 MMHG | WEIGHT: 172 LBS | BODY MASS INDEX: 26.07 KG/M2

## 2020-05-08 VITALS — DIASTOLIC BLOOD PRESSURE: 95 MMHG | SYSTOLIC BLOOD PRESSURE: 140 MMHG

## 2020-05-08 VITALS — DIASTOLIC BLOOD PRESSURE: 100 MMHG | SYSTOLIC BLOOD PRESSURE: 140 MMHG

## 2020-05-08 PROCEDURE — 93000 ELECTROCARDIOGRAM COMPLETE: CPT

## 2020-05-08 PROCEDURE — 99213 OFFICE O/P EST LOW 20 MIN: CPT | Mod: 25

## 2020-05-08 RX ORDER — AMLODIPINE BESYLATE 2.5 MG/1
2.5 TABLET ORAL DAILY
Qty: 90 | Refills: 3 | Status: DISCONTINUED | COMMUNITY
Start: 2020-02-26 | End: 2020-05-08

## 2020-05-08 NOTE — HISTORY OF PRESENT ILLNESS
[FreeTextEntry1] : pt is here for HTN. pt was diagnosed with HTN for years, taking amlopidine 2.5mg and losartan 50mg daily since 1/2020. prior he was taking lisinopril and HCTZ.  pt's BP has been high for the past week 140/90s at home. 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 Pt has been staying home, eating regularly maybe a little more snacks, less activities. No increase in alcohol drinking at all [de-identified] :  pt was diagnosed with HTN for years, taking amlopidine 2.5mg and losartan 50mg daily since 1/2020. prior he was taking lisinopril and HCTZ.  pt's BP has been high for the past week 140/90s at home. 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 Pt has been staying home, eating regularly maybe a little more snacks, less activities. No increase in alcohol drinking at all. pt also requests corona virus Ab test due to exposure to it from family member. pt had no symptoms of covid

## 2020-05-08 NOTE — PHYSICAL EXAM
[No Acute Distress] : no acute distress [Well Nourished] : well nourished [Normal] : normal rate, regular rhythm, normal S1 and S2 and no murmur heard [No Edema] : there was no peripheral edema [Normal Gait] : normal gait [Alert and Oriented x3] : oriented to person, place, and time

## 2020-05-08 NOTE — REVIEW OF SYSTEMS
[Negative] : Psychiatric [Palpitations] : no palpitations [Lower Ext Edema] : no lower extremity edema [Chest Pain] : no chest pain [FreeTextEntry5] : elevated BP lately

## 2020-06-13 ENCOUNTER — LABORATORY RESULT (OUTPATIENT)
Age: 40
End: 2020-06-13

## 2020-06-19 NOTE — H&P ADULT - DOES THIS PATIENT HAVE A HISTORY OF OR HAS BEEN DX WITH HEART FAILURE?
June 19, 2020      [facility]  [address]  [place] [address]  Phone: [phone]  Fax: [phone]       Patient: Maureen Barrera   YOB: 1996  Date of Visit: 06/19/2020    To Whom It May Concern:    Diallo Barrera  was at [facility] on 06/19/2020. She may return to work/school on 6/22/2020 with no restrictions. If you have any questions or concerns, or if I can be of further assistance, please do not hesitate to contact me.    Sincerely,      Jennifer Gracia MA     
no

## 2020-07-11 ENCOUNTER — LABORATORY RESULT (OUTPATIENT)
Age: 40
End: 2020-07-11

## 2020-07-18 ENCOUNTER — APPOINTMENT (OUTPATIENT)
Dept: INTERNAL MEDICINE | Facility: CLINIC | Age: 40
End: 2020-07-18
Payer: COMMERCIAL

## 2020-07-18 VITALS
DIASTOLIC BLOOD PRESSURE: 98 MMHG | BODY MASS INDEX: 26.52 KG/M2 | HEART RATE: 70 BPM | WEIGHT: 175 LBS | HEIGHT: 68 IN | OXYGEN SATURATION: 98 % | SYSTOLIC BLOOD PRESSURE: 145 MMHG | TEMPERATURE: 98.4 F

## 2020-07-18 PROCEDURE — 99214 OFFICE O/P EST MOD 30 MIN: CPT

## 2020-07-18 NOTE — HISTORY OF PRESENT ILLNESS
[FreeTextEntry1] : HTN, high glucose, gout, elevated LFT, and hypercholesterol\par  [de-identified] : no complaints\par

## 2020-07-18 NOTE — ASSESSMENT
[FreeTextEntry1] : repeat FY=310/94.  At home, AY=699b/90s.  stop norvasc 5 qd.  Change to Losartan HCT 50/12.5 qd.  if BP is high, will add norvasc 2.5 qd.  check BP at home.\par follow LFT\par follow A1C\par follow uric acid.  if pt has gout attack, will stop HCTZ\par follow lipid

## 2020-07-18 NOTE — PHYSICAL EXAM
[Well Nourished] : well nourished [No Acute Distress] : no acute distress [Well-Appearing] : well-appearing [Normal Outer Ear/Nose] : the outer ears and nose were normal in appearance [Well Developed] : well developed [No Respiratory Distress] : no respiratory distress  [Supple] : supple [No Accessory Muscle Use] : no accessory muscle use [Regular Rhythm] : with a regular rhythm [Clear to Auscultation] : lungs were clear to auscultation bilaterally [Normal Rate] : normal rate  [Non Tender] : non-tender [No Edema] : there was no peripheral edema [Soft] : abdomen soft [No Spinal Tenderness] : no spinal tenderness [Normal Anterior Cervical Nodes] : no anterior cervical lymphadenopathy [No CVA Tenderness] : no CVA  tenderness [Coordination Grossly Intact] : coordination grossly intact [Abnormal Temperature] : normal temperature [Cyanosis] : no cyanosis [Normal Affect] : the affect was normal [Speech Grossly Normal] : speech grossly normal [Normal Gait] : normal gait [Normal Mood] : the mood was normal

## 2020-10-09 ENCOUNTER — LABORATORY RESULT (OUTPATIENT)
Age: 40
End: 2020-10-09

## 2020-10-14 ENCOUNTER — APPOINTMENT (OUTPATIENT)
Dept: INTERNAL MEDICINE | Facility: CLINIC | Age: 40
End: 2020-10-14
Payer: COMMERCIAL

## 2020-10-14 VITALS
DIASTOLIC BLOOD PRESSURE: 100 MMHG | SYSTOLIC BLOOD PRESSURE: 140 MMHG | HEIGHT: 68 IN | WEIGHT: 167 LBS | BODY MASS INDEX: 25.31 KG/M2 | TEMPERATURE: 97.5 F

## 2020-10-14 PROCEDURE — G0008: CPT

## 2020-10-14 PROCEDURE — 99214 OFFICE O/P EST MOD 30 MIN: CPT | Mod: 25

## 2020-10-14 PROCEDURE — 90686 IIV4 VACC NO PRSV 0.5 ML IM: CPT

## 2020-10-14 NOTE — PHYSICAL EXAM
[Well Nourished] : well nourished [No Acute Distress] : no acute distress [Well Developed] : well developed [Well-Appearing] : well-appearing [Normal Outer Ear/Nose] : the outer ears and nose were normal in appearance [Supple] : supple [No Respiratory Distress] : no respiratory distress  [No Accessory Muscle Use] : no accessory muscle use [Clear to Auscultation] : lungs were clear to auscultation bilaterally [Regular Rhythm] : with a regular rhythm [Normal Rate] : normal rate  [Soft] : abdomen soft [No Edema] : there was no peripheral edema [Non Tender] : non-tender [No CVA Tenderness] : no CVA  tenderness [No Spinal Tenderness] : no spinal tenderness [Cyanosis] : no cyanosis [Abnormal Temperature] : normal temperature [Coordination Grossly Intact] : coordination grossly intact [Speech Grossly Normal] : speech grossly normal [Normal Gait] : normal gait [Normal Affect] : the affect was normal [Normal Mood] : the mood was normal

## 2020-10-14 NOTE — ASSESSMENT
[FreeTextEntry1] : follow uric acid.  no gout attack\par repeat JJ=324/96.  cont norvasc 2.5 qd.  stop hyzaar 50/12.5 qd.  start lisinopril HCT 20/12.5 qd.\par repeat LFT in 2 wks\par follow lipid and A1C

## 2020-10-28 ENCOUNTER — APPOINTMENT (OUTPATIENT)
Dept: INTERNAL MEDICINE | Facility: CLINIC | Age: 40
End: 2020-10-28
Payer: COMMERCIAL

## 2020-10-28 VITALS
HEIGHT: 68 IN | BODY MASS INDEX: 25.01 KG/M2 | SYSTOLIC BLOOD PRESSURE: 130 MMHG | TEMPERATURE: 97.6 F | WEIGHT: 165 LBS | DIASTOLIC BLOOD PRESSURE: 90 MMHG

## 2020-10-28 PROCEDURE — 36415 COLL VENOUS BLD VENIPUNCTURE: CPT

## 2020-10-28 PROCEDURE — 99072 ADDL SUPL MATRL&STAF TM PHE: CPT

## 2020-10-28 PROCEDURE — 99214 OFFICE O/P EST MOD 30 MIN: CPT | Mod: 25

## 2020-10-28 NOTE — ASSESSMENT
[FreeTextEntry1] : repeat XZ=215/84.  cont norvasc 2.5 qd and lisinopril HCT 20/12.5 qd.\par if pt has gout attack, will D/C HCTZ.\par follow lipid\par LFT

## 2020-10-28 NOTE — PHYSICAL EXAM
[No Acute Distress] : no acute distress [Well Nourished] : well nourished [Well Developed] : well developed [Well-Appearing] : well-appearing [Normal Outer Ear/Nose] : the outer ears and nose were normal in appearance [Supple] : supple [No Respiratory Distress] : no respiratory distress  [No Accessory Muscle Use] : no accessory muscle use [Clear to Auscultation] : lungs were clear to auscultation bilaterally [Normal Rate] : normal rate  [Regular Rhythm] : with a regular rhythm [No Edema] : there was no peripheral edema [No CVA Tenderness] : no CVA  tenderness [No Spinal Tenderness] : no spinal tenderness [Cyanosis] : no cyanosis [Abnormal Temperature] : normal temperature [Coordination Grossly Intact] : coordination grossly intact [Normal Gait] : normal gait [Speech Grossly Normal] : speech grossly normal [Normal Affect] : the affect was normal [Normal Mood] : the mood was normal

## 2020-11-01 LAB
ALBUMIN SERPL ELPH-MCNC: 4.8 G/DL
ALP BLD-CCNC: 80 U/L
ALT SERPL-CCNC: 42 U/L
ANION GAP SERPL CALC-SCNC: 13 MMOL/L
AST SERPL-CCNC: 30 U/L
BILIRUB SERPL-MCNC: 0.6 MG/DL
BUN SERPL-MCNC: 13 MG/DL
CALCIUM SERPL-MCNC: 10.1 MG/DL
CHLORIDE SERPL-SCNC: 97 MMOL/L
CO2 SERPL-SCNC: 27 MMOL/L
CREAT SERPL-MCNC: 0.95 MG/DL
GLUCOSE SERPL-MCNC: 89 MG/DL
POTASSIUM SERPL-SCNC: 4.2 MMOL/L
PROT SERPL-MCNC: 7.8 G/DL
SARS-COV-2 IGG SERPL IA-ACNC: 0.08 INDEX
SARS-COV-2 IGG SERPL QL IA: NEGATIVE
SODIUM SERPL-SCNC: 137 MMOL/L
URATE SERPL-MCNC: 10.2 MG/DL

## 2021-01-08 NOTE — H&P PST ADULT - PROBLEM SELECTOR PROBLEM 1
Hydroxyzine Pregnancy And Lactation Text: This medication is not safe during pregnancy and should not be taken. It is also excreted in breast milk and breast feeding isn't recommended. Ankle fracture, left, closed, initial encounter

## 2021-01-16 ENCOUNTER — LABORATORY RESULT (OUTPATIENT)
Age: 41
End: 2021-01-16

## 2021-01-23 ENCOUNTER — APPOINTMENT (OUTPATIENT)
Dept: INTERNAL MEDICINE | Facility: CLINIC | Age: 41
End: 2021-01-23
Payer: COMMERCIAL

## 2021-01-23 VITALS
HEIGHT: 68 IN | BODY MASS INDEX: 24.86 KG/M2 | WEIGHT: 164 LBS | DIASTOLIC BLOOD PRESSURE: 86 MMHG | SYSTOLIC BLOOD PRESSURE: 130 MMHG

## 2021-01-23 DIAGNOSIS — Z78.9 OTHER SPECIFIED HEALTH STATUS: ICD-10-CM

## 2021-01-23 PROCEDURE — 99396 PREV VISIT EST AGE 40-64: CPT

## 2021-01-23 PROCEDURE — 99072 ADDL SUPL MATRL&STAF TM PHE: CPT

## 2021-01-23 NOTE — ASSESSMENT
[FreeTextEntry1] : Continue same blood pressure medication.  Follow blood pressure.  check BP at home\par follow lipid.  want chol<200\par no recent gout attack.  follow UA.  want UA<6.5\par follow up in 6 mo

## 2021-01-23 NOTE — PHYSICAL EXAM
[No Acute Distress] : no acute distress [No Respiratory Distress] : no respiratory distress  [No Accessory Muscle Use] : no accessory muscle use [Clear to Auscultation] : lungs were clear to auscultation bilaterally [Normal Rate] : normal rate  [Regular Rhythm] : with a regular rhythm [No Edema] : there was no peripheral edema [Soft] : abdomen soft [Non Tender] : non-tender [Testes Mass (___cm)] : there were no testicular masses [Normal Posterior Cervical Nodes] : no posterior cervical lymphadenopathy [Normal Anterior Cervical Nodes] : no anterior cervical lymphadenopathy

## 2021-02-11 ENCOUNTER — APPOINTMENT (OUTPATIENT)
Dept: INTERNAL MEDICINE | Facility: CLINIC | Age: 41
End: 2021-02-11
Payer: COMMERCIAL

## 2021-02-11 PROCEDURE — 99213 OFFICE O/P EST LOW 20 MIN: CPT | Mod: 95

## 2021-02-11 NOTE — PHYSICAL EXAM
[No Acute Distress] : no acute distress [Speech Grossly Normal] : speech grossly normal [Normal Affect] : the affect was normal [Normal Mood] : the mood was normal

## 2021-02-11 NOTE — ASSESSMENT
[FreeTextEntry1] : fevr x 2 days.  +Covid exp 4 days ago.  quarantine.  COVID PCR at [facility]\par Continue same blood pressure medication.  Follow blood pressure.\par

## 2021-02-11 NOTE — HISTORY OF PRESENT ILLNESS
[Home] : at home, [unfilled] , at the time of the visit. [Verbal consent obtained from patient] : the patient, [unfilled] [Medical Office: ([facility])___] : at the medical office located in  [FreeTextEntry8] : Fever yesterday (T=101).  no ST, sinus kal[place], cough, V/D.\par saw brother in law 4 days ago who is +COVID

## 2021-02-12 ENCOUNTER — LABORATORY RESULT (OUTPATIENT)
Age: 41
End: 2021-02-12

## 2021-02-17 ENCOUNTER — EMERGENCY (EMERGENCY)
Facility: HOSPITAL | Age: 41
LOS: 1 days | Discharge: HOME | End: 2021-02-17
Attending: EMERGENCY MEDICINE
Payer: COMMERCIAL

## 2021-02-17 VITALS
SYSTOLIC BLOOD PRESSURE: 125 MMHG | RESPIRATION RATE: 18 BRPM | DIASTOLIC BLOOD PRESSURE: 81 MMHG | TEMPERATURE: 99 F | HEART RATE: 99 BPM | OXYGEN SATURATION: 97 %

## 2021-02-17 VITALS
TEMPERATURE: 99 F | OXYGEN SATURATION: 96 % | SYSTOLIC BLOOD PRESSURE: 111 MMHG | HEIGHT: 68 IN | DIASTOLIC BLOOD PRESSURE: 76 MMHG | HEART RATE: 96 BPM | RESPIRATION RATE: 20 BRPM

## 2021-02-17 DIAGNOSIS — Z90.49 ACQUIRED ABSENCE OF OTHER SPECIFIED PARTS OF DIGESTIVE TRACT: Chronic | ICD-10-CM

## 2021-02-17 PROCEDURE — 99284 EMERGENCY DEPT VISIT MOD MDM: CPT

## 2021-02-17 PROCEDURE — 99285 EMERGENCY DEPT VISIT HI MDM: CPT

## 2021-02-17 RX ORDER — ONDANSETRON 8 MG/1
4 TABLET, FILM COATED ORAL ONCE
Refills: 0 | Status: COMPLETED | OUTPATIENT
Start: 2021-02-17 | End: 2021-02-17

## 2021-02-17 RX ADMIN — Medication 100 MILLIGRAM(S): at 21:55

## 2021-02-17 RX ADMIN — ONDANSETRON 4 MILLIGRAM(S): 8 TABLET, FILM COATED ORAL at 21:55

## 2021-02-17 NOTE — ED ADULT NURSE NOTE - NSIMPLEMENTINTERV_GEN_ALL_ED
Implemented All Universal Safety Interventions:  [place] to call system. Call bell, personal items and telephone within reach. Instruct patient to call for assistance. Room bathroom lighting operational. Non-slip footwear when patient is off stretcher. Physically safe environment: no spills, clutter or unnecessary equipment. Stretcher in lowest position, wheels locked, appropriate side rails in place.

## 2021-02-17 NOTE — ED PROVIDER NOTE - NSFOLLOWUPINSTRUCTIONS_ED_ALL_ED_FT
Thank you for choosing [facility] for your healthcare.    You have COVID-19 and the symptoms you are having are consistent with this. Thank you for choosing [facility] for your healthcare.    You have COVID-19 and the symptoms you are having are consistent with this.  At this time in the Emergency Department your oxygen levels are good enough to be discharged.  You should continue to monitor them at home - if they worsen or if you are having severe breathing issues you should come back to the hospital.  If you are noticing that your oxygen levels are staying below 90 percent then you would need extra oxygen and would likely need to be admitted to the hospital.    Return to the Emergency Department immediately for the followin.  If you are having severe problems breathing  2.  If your oxygen saturation is below 90% and not improving at home  3.  Any other concerning medical issues

## 2021-02-17 NOTE — ED PROVIDER NOTE - PHYSICAL EXAMINATION
Exam:  General: Patient well appearing, satting >95% on room air at rest and after ambulation otherwise vital signs within normal limits  HEENT: airway patent with moist mucous membranes  Cardiac: RRR S1/S2 with strong peripheral pulses  Respiratory: faint rales at bases, mildly tachypneic  GI: abdomen soft, non tender, non distended  Neuro: no gross neurologic deficits  Skin: warm, well perfused  Psych: normal mood and affect

## 2021-02-17 NOTE — ED PROVIDER NOTE - CLINICAL SUMMARY MEDICAL DECISION MAKING FREE TEXT BOX
Symptomatic COVID-19 but not hypoxic or tachypneic enough that he warrants admission for therapy, will treat his nausea/cough in Emergency Department and discharge, instructed patient to return to the Emergency Department for worsening respiratory issues and to continue to monitor his pulse ox at home.

## 2021-02-17 NOTE — ED ADULT NURSE NOTE - OBJECTIVE STATEMENT
Pt is a 40y Male c/o hypoxia 91% at home, cough, covid + on 2/12. Pt states his symptoms have been getting worse and having decreased PO intake. Pt states he has been able to tolerate PO but just less than normal. Pt is nauseous at this time and states he has had diarrhea. Pt states he has been coughing a lot and has felt SOB. Pt O2 SAT maintained % with ambulation. Pt prefers to go by [place]. Pt resting comfortably in bed with MD and RN at bedside. Pt educated on call bell use and call bell placed at bedside. Pt safety maintained.

## 2021-02-17 NOTE — ED PROVIDER NOTE - PATIENT PORTAL LINK FT
You can access the FollowMyHealth Patient Portal offered by [facility] by registering at the following website: [email or web address]. By joining [employer]’s FollowMyHealth portal, you will also be able to view your health information using other applications (apps) compatible with our system.

## 2021-02-17 NOTE — ED PROVIDER NOTE - OBJECTIVE STATEMENT
Patient known COVID-19 positive currently day 5 of symptoms presenting with cough, malaise, nausea, fatigue.  Brother in law got him sick.  Denying chest pains.  Noted hypoxia to 91 on home pulse ox so presenting to hospital.  Never smoker, no known pulmonary disease.    PMH:  HTN  PSH:  appendicitis  Social History: Non smoker, no alcohol

## 2021-02-19 ENCOUNTER — NON-APPOINTMENT (OUTPATIENT)
Age: 41
End: 2021-02-19

## 2021-02-19 ENCOUNTER — APPOINTMENT (OUTPATIENT)
Dept: PULMONOLOGY | Facility: CLINIC | Age: 41
End: 2021-02-19
Payer: COMMERCIAL

## 2021-02-19 PROCEDURE — 99202 OFFICE O/P NEW SF 15 MIN: CPT | Mod: 95

## 2021-02-19 NOTE — PLAN
[FreeTextEntry1] : 40 year old male with HTN presents with positive COVID virus.  Will start decadron due to lower oxygen saturations.  Asked pt call us if his oxygen levels drop below 90% consistently.  Will follow up him next week.  Pt will continue to monitor oxygen saturations.   \par \par \par I interviewed the patient and developed a plan of care  Fausto Reno MD\par

## 2021-02-19 NOTE — HISTORY OF PRESENT ILLNESS
[Home] : at home, [unfilled] , at the time of the visit. [Medical Office: ([facility])___] : at the medical office located in  [Verbal consent obtained from patient] : the patient, [unfilled] [FreeTextEntry1] : 40 year old male with HTN presents with positive COVID virus.  He went to the ED on 2/17 because O2 saturations dropped to 91%.  He was sent home; readings at ED were 97% on room air.  Oxygen saturations continue to be around 91-92% but denies any significant shortness of breath.  He also has stuffy nose and some fatigue.  \par No fevers.  He was having nausea and was given zofran at the ED as well as diarrhea.  The diarrhea has somewhat subsided.  He is staying hydrated.

## 2021-02-20 ENCOUNTER — APPOINTMENT (OUTPATIENT)
Dept: INTERNAL MEDICINE | Facility: CLINIC | Age: 41
End: 2021-02-20

## 2021-03-01 ENCOUNTER — APPOINTMENT (OUTPATIENT)
Dept: PHYSICAL MEDICINE AND REHAB | Facility: CLINIC | Age: 41
End: 2021-03-01

## 2021-03-18 ENCOUNTER — RX RENEWAL (OUTPATIENT)
Age: 41
End: 2021-03-18

## 2021-05-17 ENCOUNTER — RX RENEWAL (OUTPATIENT)
Age: 41
End: 2021-05-17

## 2021-05-28 NOTE — ED ADULT NURSE NOTE - NS PRO PASSIVE SMOKE EXP
Joyce Jaramillo is a 37 year old female presenting with an itchy raised rash, oozing clear liquid.  some hives intermittently in the past month.   Denies known Latex allergy or symptoms of Latex sensitivity.  Medications reviewed and updated.  Health Maintenance Due   Topic Date Due   • Pneumococcal Vaccine 0-64 (1 of 4 - PCV13) Never done   • COVID-19 Vaccine (1) Never done       Patient is due for topics as listed above but is not proceeding with Immunization(s) COVID-19 and Pneumococcal at this time.     PHQ2 screening completed                  Unknown

## 2021-06-05 ENCOUNTER — APPOINTMENT (OUTPATIENT)
Dept: INTERNAL MEDICINE | Facility: CLINIC | Age: 41
End: 2021-06-05

## 2021-08-16 LAB
ALBUMIN SERPL ELPH-MCNC: 4.7 G/DL
ALP BLD-CCNC: 76 U/L
ALT SERPL-CCNC: 28 U/L
ANION GAP SERPL CALC-SCNC: 13 MMOL/L
AST SERPL-CCNC: 23 U/L
BILIRUB SERPL-MCNC: 0.9 MG/DL
BUN SERPL-MCNC: 13 MG/DL
CALCIUM SERPL-MCNC: 9.5 MG/DL
CHLORIDE SERPL-SCNC: 100 MMOL/L
CHOLEST SERPL-MCNC: 221 MG/DL
CO2 SERPL-SCNC: 24 MMOL/L
CREAT SERPL-MCNC: 0.97 MG/DL
ESTIMATED AVERAGE GLUCOSE: 114 MG/DL
GLUCOSE SERPL-MCNC: 107 MG/DL
HBA1C MFR BLD HPLC: 5.6 %
HDLC SERPL-MCNC: 51 MG/DL
LDLC SERPL CALC-MCNC: 145 MG/DL
LDLC SERPL DIRECT ASSAY-MCNC: 154 MG/DL
NONHDLC SERPL-MCNC: 170 MG/DL
POTASSIUM SERPL-SCNC: 4.4 MMOL/L
PROT SERPL-MCNC: 7.1 G/DL
SODIUM SERPL-SCNC: 137 MMOL/L
TRIGL SERPL-MCNC: 129 MG/DL
URATE SERPL-MCNC: 10.8 MG/DL

## 2021-08-21 ENCOUNTER — APPOINTMENT (OUTPATIENT)
Dept: INTERNAL MEDICINE | Facility: CLINIC | Age: 41
End: 2021-08-21
Payer: COMMERCIAL

## 2021-08-21 VITALS
BODY MASS INDEX: 25.31 KG/M2 | OXYGEN SATURATION: 99 % | HEART RATE: 72 BPM | HEIGHT: 68 IN | DIASTOLIC BLOOD PRESSURE: 84 MMHG | WEIGHT: 167 LBS | SYSTOLIC BLOOD PRESSURE: 132 MMHG | TEMPERATURE: 97.5 F

## 2021-08-21 PROCEDURE — 99214 OFFICE O/P EST MOD 30 MIN: CPT

## 2021-08-21 NOTE — PHYSICAL EXAM
[No Acute Distress] : no acute distress [No Accessory Muscle Use] : no accessory muscle use [No Respiratory Distress] : no respiratory distress  [Clear to Auscultation] : lungs were clear to auscultation bilaterally [Normal Rate] : normal rate  [Regular Rhythm] : with a regular rhythm [No Edema] : there was no peripheral edema

## 2021-08-21 NOTE — ASSESSMENT
[FreeTextEntry1] : right foot pain-xrays ([facility])\par Continue same blood pressure medication.  Follow blood pressure.\par follow lipid.  pt refuses chol med\par follow LFT\par follow A1C\par uric acid is very high.  pt refuses gout med.

## 2021-08-21 NOTE — HISTORY OF PRESENT ILLNESS
[FreeTextEntry1] : gout, HTN, high glucose, elevated LFT, and hypercholesterol\par  [de-identified] : occ right foot pain.  no inj.  no fever

## 2021-09-21 ENCOUNTER — RX RENEWAL (OUTPATIENT)
Age: 41
End: 2021-09-21

## 2021-09-27 NOTE — H&P PST ADULT - CONSTITUTIONAL COMMENTS
Last seen 3/12/21- No future appt    Last refill 6/15/21 qty of 60x0    Please advise on refills, thanks     ambulatory walks with 2 point crutches affected ankle immobilized  cast in place wrapped with ace bandage c/o bearable  pain on affected site

## 2021-10-30 ENCOUNTER — APPOINTMENT (OUTPATIENT)
Dept: INTERNAL MEDICINE | Facility: CLINIC | Age: 41
End: 2021-10-30
Payer: COMMERCIAL

## 2021-10-30 PROCEDURE — 90686 IIV4 VACC NO PRSV 0.5 ML IM: CPT

## 2021-10-30 PROCEDURE — G0008: CPT

## 2021-11-16 ENCOUNTER — RX RENEWAL (OUTPATIENT)
Age: 41
End: 2021-11-16

## 2022-01-08 ENCOUNTER — APPOINTMENT (OUTPATIENT)
Dept: INTERNAL MEDICINE | Facility: CLINIC | Age: 42
End: 2022-01-08
Payer: COMMERCIAL

## 2022-01-08 PROCEDURE — 99212 OFFICE O/P EST SF 10 MIN: CPT | Mod: 95

## 2022-01-08 RX ORDER — COLCHICINE 0.6 MG/1
0.6 TABLET ORAL TWICE DAILY
Qty: 20 | Refills: 0 | Status: ACTIVE | COMMUNITY
Start: 2022-01-08 | End: 1900-01-01

## 2022-01-08 NOTE — PHYSICAL EXAM
[No Acute Distress] : no acute distress [Well Nourished] : well nourished [Well Developed] : well developed [Alert and Oriented x3] : oriented to person, place, and time [Normal Mood] : the mood was normal [de-identified] : looks good on video. [de-identified] : left foot big toe swollen

## 2022-01-08 NOTE — HISTORY OF PRESENT ILLNESS
[Home] : at home, [unfilled] , at the time of the visit. [Medical Office: ([facility])___] : at the medical office located in  [Verbal consent obtained from patient] : the patient, [unfilled] [FreeTextEntry8] : Pt reports having left  big toe  swollen and pain since 3 days. No fever,chills.

## 2022-01-12 LAB — SARS-COV-2 N GENE NPH QL NAA+PROBE: NOT DETECTED

## 2022-01-24 NOTE — DISCHARGE NOTE ADULT - MEDICATION SUMMARY - MEDICATIONS TO CHANGE
Anesthesia Pre-Procedure Evaluation    Patient: Toney Denton   MRN: [id number] : 1952        Preoperative Diagnosis: Hip fracture (H) [S72.009A]    Procedure : Procedure(s):  INTERNAL FIXATION LEFT HIP TROCHANTERIC FRACTURE          Past Medical History:   Diagnosis Date     Benign essential hypertension      Chronic atrial fibrillation (H)     s/p ablation/maze procedure/LA clip.  on ASA for stroke ppx     COPD (chronic obstructive pulmonary disease) (H)     severe by PFTs     Hyperlipidemia LDL goal <100      Ischemic cardiomyopathy     EF 25 % by echo 2021, with hx of VTach s/p biV pacer and ICD     Mitral valve regurgitation     s/p bioprosthetic MVR     Tricuspid regurgitation     s/p repair      Past Surgical History:   Procedure Laterality Date     CABG GARTH QUAL ACT PERFORM       MITRAL VALVE REPLACEMENT       TRICUSPID VALVULOPLASTY        Allergies   Allergen Reactions     Spironolactone Unknown     Reported side effects after receiving it after heart surgery, resolved with ablasion      Social History     Tobacco Use     Smoking status: Former Smoker     Types: Cigars     Smokeless tobacco: Never Used   Substance Use Topics     Alcohol use: Yes     Comment: 3 beers (IPA) per day -can go for days without drinking and no hx of w/d      Wt Readings from Last 1 Encounters:   22 88.5 kg (195 lb 1.6 oz)        Anesthesia Evaluation            ROS/MED HX  ENT/Pulmonary:     (+) severe,  COPD, O2 dependent, during Both,     Neurologic:  - neg neurologic ROS     Cardiovascular: Comment: s/p ablation/maze procedure/LA clip.  on ASA for stroke ppx    EF 25 % by echo 2021, with hx of VTach s/p biV pacer and ICD    (+) Dyslipidemia hypertension-----pacemaker, ICD Reason placed:V tach Hx. dysrhythmias, a-fib, valvular problems/murmurs type: MR  Tricuspid regurgitation s/p repair.     METS/Exercise Tolerance:     Hematologic: Comments: Lab Test        22                       0557           1306          WBC          7.9          8.3           HGB          13.9         13.8          MCV          107*         105*          PLT          113*         115*          INR           --          1.09           Lab Test        01/24/22 01/23/22                       0557          1306          NA           136          137           POTASSIUM    3.7          4.0           CHLORIDE     100          99            CO2          30           32            BUN          21           22            CR           0.78         0.74          ANIONGAP     6            6             THOMAS          9.4          9.3           GLC          95           122*           - neg hematologic  ROS     Musculoskeletal:   (+) fracture, Fracture location: LLE,     GI/Hepatic:  - neg GI/hepatic ROS     Renal/Genitourinary:  - neg Renal ROS     Endo:  - neg endo ROS     Psychiatric/Substance Use:  - neg psychiatric ROS     Infectious Disease:  - neg infectious disease ROS     Malignancy:  - neg malignancy ROS     Other:  - neg other ROS          Physical Exam    Airway        Mallampati: II   TM distance: > 3 FB   Neck ROM: full   Mouth opening: > 3 cm    Respiratory Devices and Support         Dental  no notable dental history         Cardiovascular   cardiovascular exam normal          Pulmonary   pulmonary exam normal                OUTSIDE LABS:  CBC:   Lab Results   Component Value Date    WBC 7.9 01/24/2022    WBC 8.3 01/23/2022    HGB 13.9 01/24/2022    HGB 13.8 01/23/2022    HCT 42.9 01/24/2022    HCT 42.0 01/23/2022     (L) 01/24/2022     (L) 01/23/2022     BMP:   Lab Results   Component Value Date     01/24/2022     01/23/2022    POTASSIUM 3.7 01/24/2022    POTASSIUM 4.0 01/23/2022    CHLORIDE 100 01/24/2022    CHLORIDE 99 01/23/2022    CO2 30 01/24/2022    CO2 32 01/23/2022    BUN 21 01/24/2022    BUN 22 01/23/2022    CR 0.78 01/24/2022    CR 0.74 01/23/2022    GLC 95 01/24/2022     (H)  01/23/2022     COAGS:   Lab Results   Component Value Date    PTT 40 (H) 01/23/2022    INR 1.09 01/23/2022     POC: No results found for: BGM, HCG, HCGS  HEPATIC: No results found for: ALBUMIN, PROTTOTAL, ALT, AST, GGT, ALKPHOS, BILITOTAL, BILIDIRECT, CARLINE  OTHER:   Lab Results   Component Value Date    THOMAS 9.4 01/24/2022       Anesthesia Plan    ASA Status:  3      Anesthesia Type: General.     - Airway: ETT   Induction: Intravenous, Propofol.   Maintenance: Balanced.        Consents    Anesthesia Plan(s) and associated risks, benefits, and realistic alternatives discussed. Questions answered and patient/representative(s) expressed understanding.    - Discussed:     - Discussed with:  Patient      - Extended Intubation/Ventilatory Support Discussed: No.      - Patient is DNR/DNI Status: No    Use of blood products discussed: Yes.     - Discussed with: Patient.     - Consented: consented to blood products            Reason for refusal: other.     Postoperative Care    Pain management: IV analgesics.   PONV prophylaxis: Ondansetron (or other 5HT-3), Dexamethasone or Solumedrol     Comments:                Bijan Clifford MD   I will SWITCH the dose or number of times a day I take the medications listed below when I get home from the hospital:  None

## 2022-02-27 ENCOUNTER — RX RENEWAL (OUTPATIENT)
Age: 42
End: 2022-02-27

## 2022-02-27 RX ORDER — CETIRIZINE HYDROCHLORIDE 10 MG/1
10 TABLET, COATED ORAL DAILY
Qty: 30 | Refills: 9 | Status: ACTIVE | COMMUNITY
Start: 2021-08-21 | End: 1900-01-01

## 2022-03-01 DIAGNOSIS — Z12.5 ENCOUNTER FOR SCREENING FOR MALIGNANT NEOPLASM OF PROSTATE: ICD-10-CM

## 2022-03-02 ENCOUNTER — LABORATORY RESULT (OUTPATIENT)
Age: 42
End: 2022-03-02

## 2022-03-05 ENCOUNTER — APPOINTMENT (OUTPATIENT)
Dept: INTERNAL MEDICINE | Facility: CLINIC | Age: 42
End: 2022-03-05
Payer: COMMERCIAL

## 2022-03-05 VITALS
DIASTOLIC BLOOD PRESSURE: 90 MMHG | HEIGHT: 68 IN | WEIGHT: 172 LBS | TEMPERATURE: 97.4 F | OXYGEN SATURATION: 98 % | SYSTOLIC BLOOD PRESSURE: 122 MMHG | HEART RATE: 77 BPM | BODY MASS INDEX: 26.07 KG/M2

## 2022-03-05 PROCEDURE — 93000 ELECTROCARDIOGRAM COMPLETE: CPT

## 2022-03-05 PROCEDURE — 99396 PREV VISIT EST AGE 40-64: CPT | Mod: 25

## 2022-03-05 NOTE — ASSESSMENT
[FreeTextEntry1] : Gout-uric acid is very high with recent gout attack.  Want uric acid<6.5.  Start allopurinol 100 mg daily.  Patient aware of all the side effects.  Repeat Chem-20 and uric acid in 2 months (NW)\par Continue same blood pressure medication.  Follow blood pressure.\par Follow LFT\par Follow hemoglobin A1c\par Follow lipid.  Want LDL<130.

## 2022-03-05 NOTE — PHYSICAL EXAM
[No Acute Distress] : no acute distress [Well Developed] : well developed [Well-Appearing] : well-appearing [No Respiratory Distress] : no respiratory distress  [Clear to Auscultation] : lungs were clear to auscultation bilaterally [No Accessory Muscle Use] : no accessory muscle use [Normal Rate] : normal rate  [Regular Rhythm] : with a regular rhythm [No Edema] : there was no peripheral edema [Soft] : abdomen soft [Non Tender] : non-tender [Normal Posterior Cervical Nodes] : no posterior cervical lymphadenopathy [Testes Mass (___cm)] : there were no testicular masses [Normal Anterior Cervical Nodes] : no anterior cervical lymphadenopathy [No CVA Tenderness] : no CVA  tenderness [No Spinal Tenderness] : no spinal tenderness [Normal Gait] : normal gait [Speech Grossly Normal] : speech grossly normal [Normal Affect] : the affect was normal [Normal Mood] : the mood was normal

## 2022-03-19 ENCOUNTER — RX RENEWAL (OUTPATIENT)
Age: 42
End: 2022-03-19

## 2022-05-12 ENCOUNTER — APPOINTMENT (OUTPATIENT)
Dept: ORTHOPEDIC SURGERY | Facility: CLINIC | Age: 42
End: 2022-05-12
Payer: COMMERCIAL

## 2022-05-12 VITALS — HEIGHT: 68 IN | WEIGHT: 172 LBS | BODY MASS INDEX: 26.07 KG/M2

## 2022-05-12 PROCEDURE — 99203 OFFICE O/P NEW LOW 30 MIN: CPT

## 2022-05-12 PROCEDURE — 73610 X-RAY EXAM OF ANKLE: CPT | Mod: LT

## 2022-05-16 ENCOUNTER — OUTPATIENT (OUTPATIENT)
Dept: OUTPATIENT SERVICES | Facility: HOSPITAL | Age: 42
LOS: 1 days | End: 2022-05-16
Payer: COMMERCIAL

## 2022-05-16 ENCOUNTER — APPOINTMENT (OUTPATIENT)
Dept: MRI IMAGING | Facility: CLINIC | Age: 42
End: 2022-05-16

## 2022-05-16 DIAGNOSIS — Z90.49 ACQUIRED ABSENCE OF OTHER SPECIFIED PARTS OF DIGESTIVE TRACT: Chronic | ICD-10-CM

## 2022-05-16 DIAGNOSIS — M25.572 PAIN IN LEFT ANKLE AND JOINTS OF LEFT FOOT: ICD-10-CM

## 2022-05-16 PROCEDURE — 73721 MRI JNT OF LWR EXTRE W/O DYE: CPT

## 2022-05-16 PROCEDURE — 73721 MRI JNT OF LWR EXTRE W/O DYE: CPT | Mod: 26,LT

## 2022-05-24 ENCOUNTER — NON-APPOINTMENT (OUTPATIENT)
Age: 42
End: 2022-05-24

## 2022-05-26 ENCOUNTER — APPOINTMENT (OUTPATIENT)
Dept: ORTHOPEDIC SURGERY | Facility: CLINIC | Age: 42
End: 2022-05-26
Payer: COMMERCIAL

## 2022-05-26 PROCEDURE — 99213 OFFICE O/P EST LOW 20 MIN: CPT

## 2022-09-12 ENCOUNTER — APPOINTMENT (OUTPATIENT)
Dept: ORTHOPEDIC SURGERY | Facility: CLINIC | Age: 42
End: 2022-09-12

## 2022-09-12 ENCOUNTER — NON-APPOINTMENT (OUTPATIENT)
Age: 42
End: 2022-09-12

## 2022-09-12 VITALS — BODY MASS INDEX: 26.07 KG/M2 | WEIGHT: 172 LBS | HEIGHT: 68 IN

## 2022-09-12 PROCEDURE — 99213 OFFICE O/P EST LOW 20 MIN: CPT

## 2022-10-30 ENCOUNTER — TRANSCRIPTION ENCOUNTER (OUTPATIENT)
Age: 42
End: 2022-10-30

## 2022-10-30 LAB
ALBUMIN SERPL ELPH-MCNC: 4.7 G/DL
ALP BLD-CCNC: 80 U/L
ALT SERPL-CCNC: 66 U/L
ANION GAP SERPL CALC-SCNC: 11 MMOL/L
AST SERPL-CCNC: 36 U/L
BILIRUB SERPL-MCNC: 0.5 MG/DL
BUN SERPL-MCNC: 16 MG/DL
CALCIUM SERPL-MCNC: 9.8 MG/DL
CHLORIDE SERPL-SCNC: 103 MMOL/L
CHOLEST SERPL-MCNC: 213 MG/DL
CO2 SERPL-SCNC: 26 MMOL/L
CREAT SERPL-MCNC: 0.94 MG/DL
EGFR: 104 ML/MIN/1.73M2
ESTIMATED AVERAGE GLUCOSE: 117 MG/DL
GLUCOSE SERPL-MCNC: 91 MG/DL
HBA1C MFR BLD HPLC: 5.7 %
HDLC SERPL-MCNC: 51 MG/DL
LDLC SERPL CALC-MCNC: 136 MG/DL
LDLC SERPL DIRECT ASSAY-MCNC: 139 MG/DL
NONHDLC SERPL-MCNC: 162 MG/DL
POTASSIUM SERPL-SCNC: 4.2 MMOL/L
PROT SERPL-MCNC: 7.1 G/DL
SODIUM SERPL-SCNC: 139 MMOL/L
TRIGL SERPL-MCNC: 130 MG/DL
URATE SERPL-MCNC: 8.7 MG/DL

## 2022-11-19 ENCOUNTER — APPOINTMENT (OUTPATIENT)
Dept: INTERNAL MEDICINE | Facility: CLINIC | Age: 42
End: 2022-11-19

## 2022-11-19 VITALS
SYSTOLIC BLOOD PRESSURE: 120 MMHG | WEIGHT: 177 LBS | DIASTOLIC BLOOD PRESSURE: 80 MMHG | BODY MASS INDEX: 26.83 KG/M2 | HEIGHT: 68 IN

## 2022-11-19 PROCEDURE — 99214 OFFICE O/P EST MOD 30 MIN: CPT | Mod: 25

## 2022-11-19 PROCEDURE — 36415 COLL VENOUS BLD VENIPUNCTURE: CPT

## 2022-11-19 PROCEDURE — G0008: CPT

## 2022-11-19 PROCEDURE — 90682 RIV4 VACC RECOMBINANT DNA IM: CPT

## 2022-11-19 NOTE — ASSESSMENT
[FreeTextEntry1] : Hypertension-continue Norvasc 2.5 mg daily.  Continue lisinopril 20 mg daily.  Continue HCTZ 12.5 mg daily.  Follow blood pressure\par Hyperlipidemia-want LDL<100\par Follow hemoglobin A1c\par Gout-increase allopurinol from 100 mg daily to 200 mg qd.  Follow uric acid.  Want uric acid<6.5\par Elevated LFT-repeat LFT.  Iron studies.  Abdominal ultrasound (NW)\par Flu vaccine\par COVID-19 omicron specific booster vaccine was recommended to the patient.\par Blood was drawn at office today.\par Patient understands importance of weight reduction\par

## 2022-11-19 NOTE — HISTORY OF PRESENT ILLNESS
[FreeTextEntry1] : Hypertension, hyperlipidemia, high glucose, gout, and elevated LFT [de-identified] : no complaints\par

## 2022-11-26 LAB
A1AT SERPL-MCNC: 112 MG/DL
ALBUMIN SERPL ELPH-MCNC: 4.5 G/DL
ALP BLD-CCNC: 80 U/L
ALT SERPL-CCNC: 81 U/L
ANION GAP SERPL CALC-SCNC: 12 MMOL/L
AST SERPL-CCNC: 48 U/L
BILIRUB SERPL-MCNC: 0.6 MG/DL
BUN SERPL-MCNC: 12 MG/DL
CALCIUM SERPL-MCNC: 10 MG/DL
CERULOPLASMIN SERPL-MCNC: 19 MG/DL
CHLORIDE SERPL-SCNC: 102 MMOL/L
CO2 SERPL-SCNC: 24 MMOL/L
CREAT SERPL-MCNC: 0.89 MG/DL
EGFR: 110 ML/MIN/1.73M2
GLUCOSE SERPL-MCNC: 98 MG/DL
HAV IGM SER QL: NONREACTIVE
HBV SURFACE AG SER QL: NONREACTIVE
HCV AB SER QL: NONREACTIVE
HCV S/CO RATIO: 0.05 S/CO
IRON SATN MFR SERPL: 40 %
IRON SERPL-MCNC: 129 UG/DL
POTASSIUM SERPL-SCNC: 4.7 MMOL/L
PROT SERPL-MCNC: 7.3 G/DL
SMOOTH MUSCLE AB SER QL IF: NORMAL
SODIUM SERPL-SCNC: 138 MMOL/L
TIBC SERPL-MCNC: 325 UG/DL
UIBC SERPL-MCNC: 196 UG/DL

## 2022-11-28 NOTE — H&P PST ADULT - AS BP NONINV METHOD
11/28/2022 social work transition of care planning  Sw followed up with pt's spouse to discuss transition of care planning. Pt's spouse provided [facility]ha [facility]-referral made, [facility]-referral made and [facility] care. Sw will follow.   Electronically signed by LALA Ca on 11/28/2022 at 2:29 PM electronic

## 2022-12-19 ENCOUNTER — APPOINTMENT (OUTPATIENT)
Dept: ULTRASOUND IMAGING | Facility: CLINIC | Age: 42
End: 2022-12-19

## 2022-12-21 NOTE — ED ADULT NURSE NOTE - CAS TRG GEN SKIN COLOR
Schedule EGD with dilation and colonoscopy, we will call to schedule.     Take your carvedilol the morning of the procedure.  Hold your lisinopril for 24 hours prior to the procedure.   Hold your sildenafil for 3 days prior to the procedure.   Humalog: On prep day, take 1/2 of your usual dose or cover your carbs. On procedure day, no not take in the morning. Can resume usual lunch/dinner dose as usual if eating postprocedure.  Glargine: The day before the prep,  take half of your usual bedtime dose. On prep day, do not take your bedtime dose. On procedure day, can resume your usual bedtime dose.     Hold all herbal supplements and multivitamins including iron supplements for 7 days before procedure. Hold all NSAIDs/Aspirin 3 days before procedure. Can take all other medications with a sip of water the morning of the procedure.    Increase fiber and water in diet. Can use fiber supplements such as Metamucil, Benefiber, Citrucel, capsules, gummies or other fiber products.      Please call the office with questions or concerns, [phone] or [phone].    Follow up in 3-4 months or sooner if needed.       HIGH FIBER DIET    Dietary Fiber is the part of the plant foods - vegetable and grain - that is not broken down by digestive juices in the intestine, as are other food elements. It is important for normal functioning of the digestive tract. It 'holds' water in the intestine, adds bulk to and softens stools, and regulates the time it takes for food waste to move through the body. There are two major types of dietary fiber, insoluble fiber and soluble fiber.  Insoluble fiber helps prevent constipation and hemorrhoids. It also helps satisfy the appetite by creating a feeling of fullness. Soluble fiber may play a role in reducing blood cholesterol and blood glucose (sugar) levels.    Your needs are 20 - 35 grams of dietary fiber a day. Good sources of fiber include whole-grain cereals, breads and pasta, bran,  fruits and vegetable, legumes, nuts and seeds.  Bran cereal is a concentrated source of fiber. You can get about 20 grams of dietary fiber if you choose at least three servings a day of vegetables plus two servings a day of fruit plus three servings a day of whole-grain products.    The amount of dietary fiber in a serving of food is listed in grams (g) and as a percent of the Daily Value on the nutrition label. The  may also list the amount of insoluble fiber and soluble fiber. The percent Daily Value (% Daily Value) for dietary fiber gives a general idea of how much fiber a serving contrtibutes to a 2,000 calorie reference diet. Using this information can help you make healthful food choices.    Six (6) to eight (8) glasses of water or other fluids are also recommended daily. It is important to drink plenty of fluids when increasing fiber intake. If fluids are not taken, severe constipation could result.    This general guide is provided by the Nutrition Therapy Department at [facility]. If you have questions or would like more information, call the dietitian at [phone] or [phone].        DIETARY FIBER IN FOODS         AMOUNT FIBER (gr)  AMOUNT FIBER (gr)     per serving   per serving   BREADS & CRACKERS   CEREALS     jackie cracker 2 squares 1.5  All Bran, 100% 1/3 cup 8.5    pumpernickel 3/4 slice 1.5  bran chex 1/2 cup 4.0    rye bread  1 slice 1.0  corn bran  1/2 cup 4.5   wholewheat bread 1 slice 1.5  corn flakes  3/4 cup 2.5   wholewheat cracker 6 crackers 1.5  grapenut flakes  2/3 cup 2.5   wholewheat roll 3/4 roll 1.0  grapenuts 3 Tbsp 2.5      oatmeal  3/4 pkg 2.5      shredded wheat  1 biscuit 3.0   FRUITS   Wheaties  3/4 cup 2.5   apple  1/2 large 2.0      apricot  2 1.5      banana 1/2 medium 1.5 MEATS     blackberries 3/4 cup 6.5 beef, pork 1 ounce 0   cantalope  1 cup 1.5 chicken, turkey 1 ounce 0   cherries 10 large 1.0  cheese 3/4 ounce 0   dates, dried  2 1.5  cold cuts, treviño 1 ounce 0   grapes, white  1 medium 1.0 egg 1 large 0   grapefruit    10 1.0 fish 2 ounces 0   honeydew melon  1/2 1.5 ice cream 1 ounce 0   orange 1 small 1.5 milk 1 cup 0   peach  1 medium  1.5 yogurt 5 ounces 0   pear   1/2 medium 2.0      pineapple 1/2 1.0      plum   3 small 2.0 LEAF VEGETABLES     prunes, dried 2 2.5 broccoli   1/2 cup 3.5   raisins  1 1/2 Tbsp 1.0 brussels sprouts 1/2 cup 2.5   strawberries 1 cup 3.0 cabbage   1/2 cup 2.0   tangerine 1 large 2.0 cauliflower 1/2 cup 1.5   watermelon 1 cup 1.5 celery 1/2 cup 1.0      lettuce  1 cup 1.0      spinach, raw 1 cup 0.5   RICE   turnip greens 1/2 cup 3.5   brown rice (cooked) 1/3 cup 1.5      white rice (cooked) 1/3 cup 0.5 OTHER        VEGETABLES        beans, green 1/2 cup 2.0   ROOT VEGETABLES   beans, string  1/2 cup 2.0    beets  1/2 cup 2.0 cucumber 1/2 cup 1.0   carrots  1/2 cup 2.5 eggplant   1/2 cup 2.5   potatoes, baked  1/2 medium 2.0 lentils, cooked 1/2 cup 4.0   radishes   1/2 cup 1.5 mushrooms 1/2 cup 1.0   sweet potatoes, baked  1/2 medium 2.0 onions  1/2 cup 1.0      tomato   1 small 1.5      winter squash 1/2 cup 3.5      zucchini squash 1/2 cup 2.0      Normal for race

## 2022-12-27 ENCOUNTER — TRANSCRIPTION ENCOUNTER (OUTPATIENT)
Age: 42
End: 2022-12-27

## 2023-01-04 ENCOUNTER — APPOINTMENT (OUTPATIENT)
Dept: ULTRASOUND IMAGING | Facility: CLINIC | Age: 43
End: 2023-01-04
Payer: COMMERCIAL

## 2023-01-04 ENCOUNTER — RESULT REVIEW (OUTPATIENT)
Age: 43
End: 2023-01-04

## 2023-01-04 PROCEDURE — 76982 USE 1ST TARGET LESION: CPT | Mod: 59

## 2023-01-04 PROCEDURE — 76700 US EXAM ABDOM COMPLETE: CPT

## 2023-01-05 ENCOUNTER — NON-APPOINTMENT (OUTPATIENT)
Age: 43
End: 2023-01-05

## 2023-01-26 NOTE — BRIEF OPERATIVE NOTE - URINE OUTPUT
Patient ID: Fadumo Barnhart is a 45 y.o. female.    Chief Complaint: URI    The patient initiated a request through [employer] on 1/26/2023 for evaluation and management with a chief complaint of URI     I evaluated the questionnaire submission on 1/26/2023.    Ohs Peq Evisit Upper Respitatory/Cough Questionnaire    1/26/2023  7:46 AM CST - Filed by Patient   Do you agree to participate in an E-Visit? Yes   If you have any of the following symptoms, please present to your local ER or call 911:  I acknowledge   What is the main issue that you would like for your doctor to address today? Sinus and allergy symptoms   Are you able to take your vital signs? No   What symptoms do you currently have?  Cough;  Fatigue;  Headache;  Nasal Congestion;  Muscle or body aches;  Runny nose;  Sore throat   Have you had a fever? Yes   What has been the range of your fever? Less than 100.4   When did your symptoms first appear? 1/23/2023   In the last two weeks, have you been in close contact with someone who has COVID-19 or the Flu? No   In the last two weeks, have you worked or volunteered in a healthcare facility or as a ? Healthcare facilities include a hospital, medical or dental clinic, long-term care facility, or nursing home No   Do you live in a long-term care facility, nursing home, or homeless shelter? No   List what you have done or taken to help your symptoms. Antihistamine, decongestant, cough suppressant, Fever reducer   How severe are your symptoms? Moderate   Have you taken an at home Covid test? Yes   What were the results? Negative   Have you taken a Flu test? No   Have you been fully vaccinated for COVID? (2 Pfizer, 2 Moderna or 1 Sammy & Sammy vaccine injections) Yes   Have you received a booster? No   Have you recieved a Flu shot? No   Do you have transportation to get tested for COVID if it is indicated and ordered for you at an [facility] location? Yes   Are you currently pregnant, could you be  pregnant, or are you breast feeding? 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   Latest known visit with results is:   Admission on 01/13/2023, Discharged on 01/13/2023   Component Date Value Ref Range Status    POC Preg Test, Ur 01/13/2023 Negative  Negative Final     Acceptable 01/13/2023 Yes   Final    POCT Glucose 01/13/2023 58 (L)  70 - 110 mg/dL Final    Albumin, POC 01/13/2023 3.9  3.3 - 5.5 g/dL Final    Alkaline Phosphatase, POC 01/13/2023 81  42 - 141 U/L Final    ALT (SGPT), POC 01/13/2023 21  10 - 47 U/L Final    Amylase, POC 01/13/2023 46  14 - 97 U/L Final    AST (SGOT), POC 01/13/2023 27  11 - 38 U/L Final    POC GGT 01/13/2023 18  5 - 65 U/L Final    Bilirubin, POC 01/13/2023 0.7  0.2 - 1.6 mg/dL Final    Protein, POC 01/13/2023 8.2 (H)  6.4 - 8.1 g/dL Final    POCT Glucose 01/13/2023 100  70 - 110 mg/dL Final    Glucose, UA 01/13/2023 Negative   Final    Bilirubin, UA 01/13/2023 Negative   Final    Ketones, UA 01/13/2023 Negative   Final    Spec Grav UA 01/13/2023 1.025   Final    Blood, UA 01/13/2023 Negative   Final    PH, UA 01/13/2023 5.5   Final    Protein, UA 01/13/2023 Negative   Final    Urobilinogen, UA 01/13/2023 0.2  E.U./dL Final    Nitrite, UA 01/13/2023 Negative   Final    Leukocytes, UA 01/13/2023 Negative   Final    Color, UA 01/13/2023 Yellow   Final    Clarity, UA 01/13/2023 Clear   Final    POCT Glucose 01/13/2023 108  70 - 110 mg/dL Final    Albumin,  POC 01/13/2023 3.8  3.3 - 5.5 g/dL Final    Alkaline Phosphatase, POC 01/13/2023 88  42 - 141 U/L Final    ALT (SGPT), POC 01/13/2023 24  10 - 47 U/L Final    AST (SGOT), POC 01/13/2023 28  11 - 38 U/L Final    POC BUN 01/13/2023 11  7 - 22 mg/dL Final    Calcium, POC 01/13/2023 9.7  8.0 - 10.3 mg/dL Final    POC Chloride 01/13/2023 108  98 - 108 mmol/L Final    POC Creatinine 01/13/2023 1.1  0.6 - 1.2 mg/dL Final    POC Glucose 01/13/2023 42 (LL)  73 - 118 mg/dL Final    POC Potassium 01/13/2023 3.7  3.6 - 5.1 mmol/L Final    POC Sodium 01/13/2023 144  128 - 145 mmol/L Final    Bilirubin, POC 01/13/2023 0.7  0.2 - 1.6 mg/dL Final    POC TCO2 01/13/2023 28  18 - 33 mmol/L Final    Protein, POC 01/13/2023 8.2 (H)  6.4 - 8.1 g/dL Final    POCT Glucose 01/13/2023 90  70 - 110 mg/dL Final       Encounter Diagnosis   Name Primary?    Upper respiratory tract infection, unspecified type Yes        No orders of the defined types were placed in this encounter.     Medications Ordered This Encounter   Medications    methylPREDNISolone (MEDROL DOSEPACK) 4 mg tablet     Sig: use as directed     Dispense:  21 each     Refill:  0    promethazine-dextromethorphan (PROMETHAZINE-DM) 6.25-15 mg/5 mL Syrp     Sig: Take 5 mLs by mouth every 4 (four) hours as needed (cough).     Dispense:  180 mL     Refill:  0   Sent Patient a message through [employer]   Hold antibiotics for now - most likely viral     E-Visit Time Tracking:    Day 1 Time (in minutes): 7     Total Time (in minutes): 7          0

## 2023-06-10 NOTE — ED ADULT NURSE NOTE - NSFALLRSKASSESSTYPE_ED_ALL_ED
Vital Signs Last 24 Hrs  T(C): 36.4 (10 Mata 2023 10:37), Max: 36.5 (10 Mata 2023 08:07)  T(F): 97.5 (10 Mata 2023 10:37), Max: 97.7 (10 Mata 2023 08:07)  HR: 60 (10 Mata 2023 10:37) (60 - 68)  BP: 131/84 (10 Mata 2023 10:37) (131/84 - 139/79)  BP(mean): 98 (10 Mata 2023 10:37) (97 - 98)  RR: 18 (10 Mata 2023 10:37) (16 - 18)  SpO2: 99% (10 Mata 2023 10:37) (96% - 99%)    Parameters below as of 10 Mata 2023 10:37  Patient On (Oxygen Delivery Method): room air    PHYSICAL EXAM:      Constitutional: NAD  Eyes: perrl, no conjunctival changes  ENMT: no exudates, moist oral muc, uvula midline  Neck: no JVD, no LAD  Back: no cva tenderness  Respiratory: CTA, no exp wheezes  Cardiovascular: S1S2 reg, III/VI cielo right second IC space  Gastrointestinal: abd soft, NT/ND + BS  Genitourinary: voiding  Extremities: FROM, no joint effusions, no edema, no clubbing , no cyanosis  Vascular: pedal pulses + bilateral, warm extremities  Neurological: non focal, mot str 5/5/ all extr  Skin: no rashes  Lymph Nodes: no LAD Initial (On Arrival)

## 2023-06-19 ENCOUNTER — RX RENEWAL (OUTPATIENT)
Age: 43
End: 2023-06-19

## 2023-06-20 LAB
ALBUMIN MFR SERPL ELPH: 63.6 %
ALBUMIN SERPL-MCNC: 4.7 G/DL
ALBUMIN/GLOB SERPL: 1.7 RATIO
ALPHA1 GLOB MFR SERPL ELPH: 3.2 %
ALPHA1 GLOB SERPL ELPH-MCNC: 0.2 G/DL
ALPHA2 GLOB MFR SERPL ELPH: 7.1 %
ALPHA2 GLOB SERPL ELPH-MCNC: 0.5 G/DL
B-GLOBULIN MFR SERPL ELPH: 12.1 %
B-GLOBULIN SERPL ELPH-MCNC: 0.9 G/DL
GAMMA GLOB FLD ELPH-MCNC: 1 G/DL
GAMMA GLOB MFR SERPL ELPH: 14 %
INTERPRETATION SERPL IEP-IMP: NORMAL
PROT SERPL-MCNC: 7.4 G/DL
PROT SERPL-MCNC: 7.4 G/DL

## 2023-06-21 ENCOUNTER — LABORATORY RESULT (OUTPATIENT)
Age: 43
End: 2023-06-21

## 2023-06-22 LAB
ALBUMIN SERPL ELPH-MCNC: 5 G/DL
ALP BLD-CCNC: 92 U/L
ALT SERPL-CCNC: 79 U/L
ANION GAP SERPL CALC-SCNC: 14 MMOL/L
AST SERPL-CCNC: 38 U/L
BILIRUB SERPL-MCNC: 0.4 MG/DL
BUN SERPL-MCNC: 14 MG/DL
CALCIUM SERPL-MCNC: 10.3 MG/DL
CHLORIDE SERPL-SCNC: 103 MMOL/L
CHOLEST SERPL-MCNC: 216 MG/DL
CO2 SERPL-SCNC: 22 MMOL/L
CREAT SERPL-MCNC: 0.86 MG/DL
EGFR: 110 ML/MIN/1.73M2
ESTIMATED AVERAGE GLUCOSE: 117 MG/DL
GLUCOSE SERPL-MCNC: 96 MG/DL
HBA1C MFR BLD HPLC: 5.7 %
HDLC SERPL-MCNC: 52 MG/DL
LDLC SERPL CALC-MCNC: 131 MG/DL
NONHDLC SERPL-MCNC: 164 MG/DL
POTASSIUM SERPL-SCNC: 4.5 MMOL/L
PROT SERPL-MCNC: 7.6 G/DL
PSA SERPL-MCNC: 0.35 NG/ML
SODIUM SERPL-SCNC: 139 MMOL/L
T3 SERPL-MCNC: 100 NG/DL
T3RU NFR SERPL: 0.9 TBI
T4 FREE SERPL-MCNC: 1.4 NG/DL
TRIGL SERPL-MCNC: 165 MG/DL
TSH SERPL-ACNC: 1.14 UIU/ML
URATE SERPL-MCNC: 6.6 MG/DL

## 2023-06-24 ENCOUNTER — APPOINTMENT (OUTPATIENT)
Dept: INTERNAL MEDICINE | Facility: CLINIC | Age: 43
End: 2023-06-24
Payer: COMMERCIAL

## 2023-06-24 VITALS
HEIGHT: 68 IN | BODY MASS INDEX: 27.43 KG/M2 | WEIGHT: 181 LBS | DIASTOLIC BLOOD PRESSURE: 90 MMHG | SYSTOLIC BLOOD PRESSURE: 140 MMHG

## 2023-06-24 DIAGNOSIS — U07.1 COVID-19: ICD-10-CM

## 2023-06-24 PROCEDURE — 93000 ELECTROCARDIOGRAM COMPLETE: CPT

## 2023-06-24 PROCEDURE — 99396 PREV VISIT EST AGE 40-64: CPT | Mod: 25

## 2023-06-24 NOTE — ASSESSMENT
[FreeTextEntry1] : Discussed with patient importance of healthy diet, regular exercise (> 2.5 hours/week), and maintaining healthy weight.\par All medications were reviewed with patient.\par Hypertension-continue Norvasc 2.5 mg daily.  Continue lisinopril HCTZ 20/12.5 daily.\par Gout-follow uric acid.  Want uric acid<6.5.  Continue allopurinol 200 mg daily\par Hyperlipidemia-want LDL<100.  Want triglyceride<150.   Triglyceride, HDL, and LDL results from last week were reviewed with patient.\par Hemoglobin A1c result from last week was reviewed with patient.\par Follow-up in 6 months\par ABCD of skin cancer reviewed with patient.\par LFT results from last week were reviewed with patient.\par \par

## 2023-06-26 ENCOUNTER — APPOINTMENT (OUTPATIENT)
Dept: ORTHOPEDIC SURGERY | Facility: CLINIC | Age: 43
End: 2023-06-26
Payer: COMMERCIAL

## 2023-06-26 VITALS
DIASTOLIC BLOOD PRESSURE: 92 MMHG | BODY MASS INDEX: 27.28 KG/M2 | HEIGHT: 68 IN | SYSTOLIC BLOOD PRESSURE: 134 MMHG | WEIGHT: 180 LBS | HEART RATE: 87 BPM

## 2023-06-26 PROCEDURE — 99213 OFFICE O/P EST LOW 20 MIN: CPT

## 2023-06-26 PROCEDURE — 73590 X-RAY EXAM OF LOWER LEG: CPT | Mod: RT

## 2023-06-26 PROCEDURE — 99203 OFFICE O/P NEW LOW 30 MIN: CPT

## 2023-10-12 ENCOUNTER — RX RENEWAL (OUTPATIENT)
Age: 43
End: 2023-10-12

## 2023-11-09 ENCOUNTER — RX RENEWAL (OUTPATIENT)
Age: 43
End: 2023-11-09

## 2023-12-03 ENCOUNTER — NON-APPOINTMENT (OUTPATIENT)
Age: 43
End: 2023-12-03

## 2023-12-25 NOTE — CONSULT NOTE ADULT - ATTENDING COMMENTS
Pt seen and examined.  Exam and plan as above
Patient requests all Lab, Cardiology, and Radiology Results on their Discharge Instructions

## 2024-01-03 ENCOUNTER — RX RENEWAL (OUTPATIENT)
Age: 44
End: 2024-01-03

## 2024-01-17 NOTE — HISTORY OF PRESENT ILLNESS
Problem: Adult Inpatient Plan of Care  Goal: Plan of Care Review  Outcome: Met  Goal: Patient-Specific Goal (Individualized)  Outcome: Met  Goal: Absence of Hospital-Acquired Illness or Injury  Outcome: Met  Goal: Optimal Comfort and Wellbeing  Outcome: Met  Goal: Readiness for Transition of Care  Outcome: Met     Problem: Impaired Wound Healing  Goal: Optimal Wound Healing  Outcome: Met     Problem: Skin Injury Risk Increased  Goal: Skin Health and Integrity  Outcome: Met     Problem: Infection  Goal: Absence of Infection Signs and Symptoms  Outcome: Met     Problem: Adjustment to Illness (Stroke, Ischemic/Transient Ischemic Attack)  Goal: Optimal Coping  Outcome: Met     Problem: Bowel Elimination Impaired (Stroke, Ischemic/Transient Ischemic Attack)  Goal: Effective Bowel Elimination  Outcome: Met     Problem: Cerebral Tissue Perfusion (Stroke, Ischemic/Transient Ischemic Attack)  Goal: Optimal Cerebral Tissue Perfusion  Outcome: Met     Problem: Cognitive Impairment (Stroke, Ischemic/Transient Ischemic Attack)  Goal: Optimal Cognitive Function  Outcome: Met     Problem: Communication Impairment (Stroke, Ischemic/Transient Ischemic Attack)  Goal: Improved Communication Skills  Outcome: Met     Problem: Functional Ability Impaired (Stroke, Ischemic/Transient Ischemic Attack)  Goal: Optimal Functional Ability  Outcome: Met     Problem: Respiratory Compromise (Stroke, Ischemic/Transient Ischemic Attack)  Goal: Effective Oxygenation and Ventilation  Outcome: Met     Problem: Sensorimotor Impairment (Stroke, Ischemic/Transient Ischemic Attack)  Goal: Improved Sensorimotor Function  Outcome: Met     Problem: Swallowing Impairment (Stroke, Ischemic/Transient Ischemic Attack)  Goal: Optimal Eating and Swallowing without Aspiration  Outcome: Met     Problem: Urinary Elimination Impaired (Stroke, Ischemic/Transient Ischemic Attack)  Goal: Effective Urinary Elimination  Outcome: Met      [FreeTextEntry1] : HTN [de-identified] : BP at home=160/100.  patient denies headache, dizziness, SOB,or chest pain.

## 2024-03-04 ENCOUNTER — RX RENEWAL (OUTPATIENT)
Age: 44
End: 2024-03-04

## 2024-04-02 ENCOUNTER — RX RENEWAL (OUTPATIENT)
Age: 44
End: 2024-04-02

## 2024-04-20 LAB
ALBUMIN SERPL ELPH-MCNC: 4.6 G/DL
ALP BLD-CCNC: 84 U/L
ALT SERPL-CCNC: 73 U/L
ANION GAP SERPL CALC-SCNC: 13 MMOL/L
AST SERPL-CCNC: 38 U/L
BILIRUB SERPL-MCNC: 0.5 MG/DL
BUN SERPL-MCNC: 17 MG/DL
CALCIUM SERPL-MCNC: 9.9 MG/DL
CHLORIDE SERPL-SCNC: 103 MMOL/L
CHOLEST SERPL-MCNC: 218 MG/DL
CO2 SERPL-SCNC: 25 MMOL/L
CREAT SERPL-MCNC: 1.04 MG/DL
EGFR: 91 ML/MIN/1.73M2
ESTIMATED AVERAGE GLUCOSE: 114 MG/DL
GLUCOSE SERPL-MCNC: 106 MG/DL
HBA1C MFR BLD HPLC: 5.6 %
HDLC SERPL-MCNC: 54 MG/DL
LDLC SERPL CALC-MCNC: 135 MG/DL
NONHDLC SERPL-MCNC: 164 MG/DL
POTASSIUM SERPL-SCNC: 4.4 MMOL/L
PROT SERPL-MCNC: 6.9 G/DL
SODIUM SERPL-SCNC: 140 MMOL/L
TRIGL SERPL-MCNC: 165 MG/DL
URATE SERPL-MCNC: 6.9 MG/DL

## 2024-04-27 ENCOUNTER — APPOINTMENT (OUTPATIENT)
Dept: INTERNAL MEDICINE | Facility: CLINIC | Age: 44
End: 2024-04-27
Payer: COMMERCIAL

## 2024-04-27 VITALS
BODY MASS INDEX: 27.58 KG/M2 | OXYGEN SATURATION: 98 % | WEIGHT: 182 LBS | SYSTOLIC BLOOD PRESSURE: 130 MMHG | DIASTOLIC BLOOD PRESSURE: 90 MMHG | HEART RATE: 78 BPM | HEIGHT: 68 IN

## 2024-04-27 DIAGNOSIS — S86.119A STRAIN OF OTHER MUSCLE(S) AND TENDON(S) OF POSTERIOR MUSCLE GROUP AT LOWER LEG LEVEL, UNSPECIFIED LEG, INITIAL ENCOUNTER: ICD-10-CM

## 2024-04-27 DIAGNOSIS — M10.9 GOUT, UNSPECIFIED: ICD-10-CM

## 2024-04-27 DIAGNOSIS — I10 ESSENTIAL (PRIMARY) HYPERTENSION: ICD-10-CM

## 2024-04-27 DIAGNOSIS — R73.01 IMPAIRED FASTING GLUCOSE: ICD-10-CM

## 2024-04-27 DIAGNOSIS — J30.9 ALLERGIC RHINITIS, UNSPECIFIED: ICD-10-CM

## 2024-04-27 DIAGNOSIS — Z00.00 ENCOUNTER FOR GENERAL ADULT MEDICAL EXAMINATION W/OUT ABNORMAL FINDINGS: ICD-10-CM

## 2024-04-27 DIAGNOSIS — R79.89 OTHER SPECIFIED ABNORMAL FINDINGS OF BLOOD CHEMISTRY: ICD-10-CM

## 2024-04-27 DIAGNOSIS — E78.00 PURE HYPERCHOLESTEROLEMIA, UNSPECIFIED: ICD-10-CM

## 2024-04-27 PROCEDURE — 99214 OFFICE O/P EST MOD 30 MIN: CPT

## 2024-04-27 PROCEDURE — G2211 COMPLEX E/M VISIT ADD ON: CPT

## 2024-04-27 NOTE — ASSESSMENT
[FreeTextEntry1] : Discussed with patient importance of healthy diet, regular exercise (> 2.5 hours/week), and maintaining healthy weight. Hypertension.  Continue Norvasc 2.5 mg daily.  Continue lisinopril HCTZ 20/12.5 daily Hyperlipidemia.  want LDL<100.   Triglyceride, HDL, and LDL results from last week were reviewed with patient. RX CT calcium score (NW).  Based upon results of CT calcium score, will determine if patient needs cholesterol medication. Hemoglobin A1c result from last week was reviewed with patient.  Urine albumin Gout.  No recent gout attack.  Follow uric acid.  Continue allopurinol 200 mg daily. Elevated LFT.  Refer to GI COVID-19  booster vaccine is recommended once a year in fall.  If patient did not receive COVID-19 booster vaccine in fall 2023, I strongly recommend they go to local pharmacy for COVID-19 booster vaccine within next 2 weeks. CPE in October

## 2024-04-27 NOTE — PHYSICAL EXAM
[No Acute Distress] : no acute distress [Well-Appearing] : well-appearing [Supple] : supple [No Respiratory Distress] : no respiratory distress  [No Accessory Muscle Use] : no accessory muscle use [Clear to Auscultation] : lungs were clear to auscultation bilaterally [Normal Rate] : normal rate  [Regular Rhythm] : with a regular rhythm [No Edema] : there was no peripheral edema [Soft] : abdomen soft

## 2024-04-27 NOTE — HISTORY OF PRESENT ILLNESS
[FreeTextEntry1] : Hypertension, hyperlipidemia, high glucose, gout, and elevated LFT [de-identified] : no complaints

## 2024-04-28 LAB
CREAT SPEC-SCNC: 91 MG/DL
MICROALBUMIN 24H UR DL<=1MG/L-MCNC: <1.2 MG/DL
MICROALBUMIN/CREAT 24H UR-RTO: NORMAL MG/G

## 2024-05-01 ENCOUNTER — RX RENEWAL (OUTPATIENT)
Age: 44
End: 2024-05-01

## 2024-05-09 ENCOUNTER — APPOINTMENT (OUTPATIENT)
Dept: GASTROENTEROLOGY | Facility: CLINIC | Age: 44
End: 2024-05-09
Payer: COMMERCIAL

## 2024-05-09 DIAGNOSIS — K76.0 FATTY (CHANGE OF) LIVER, NOT ELSEWHERE CLASSIFIED: ICD-10-CM

## 2024-05-09 PROCEDURE — 99203 OFFICE O/P NEW LOW 30 MIN: CPT

## 2024-05-16 LAB
AST SERPL W P-5'-P-CCNC: 45 IU/L
CHOLEST SERPL-MCNC: 217 MG/DL
COMMENT:: NORMAL
FIBROSIS STAGE SERPL QL: NORMAL
FIBROSURE ALPHA 2 MACROGLOBULINS: 108 MG/DL
FIBROSURE ALT (SGPT): 74 IU/L
FIBROSURE APOLIPOPROTEIN A1: 156 MG/DL
FIBROSURE GGT: 34 IU/L
FIBROSURE HAPTOGLOBIN: 65 MG/DL
FIBROSURE SCORING: NORMAL
FIBROSURE TOTAL BILIRUBIN: 0.4 MG/DL
GLUCOSE SERPL-MCNC: 105 MG/DL
INTERPRETATIONS:: NORMAL
LIVER FIBR SCORE SERPL CALC.FIBROSURE: 0.08
Lab: NORMAL
NASH SCORING: NORMAL
NECROINFLAMMATORY ACT GRADE SERPL QL: NORMAL
NECROINFLAMMATORY ACT SCORE SERPL: 0.54
SERVICE CMNT-IMP: NORMAL
STEATOSIS GRADE: NORMAL
STEATOSIS SCORE: 0.6
STEATOSIS SCORING: NORMAL
TRIGL SERPL-MCNC: 160 MG/DL

## 2024-05-20 RX ORDER — MONTELUKAST 10 MG/1
10 TABLET, FILM COATED ORAL
Qty: 30 | Refills: 5 | Status: ACTIVE | COMMUNITY
Start: 2024-04-27 | End: 1900-01-01

## 2024-05-20 RX ORDER — AMLODIPINE BESYLATE 2.5 MG/1
2.5 TABLET ORAL
Qty: 90 | Refills: 1 | Status: ACTIVE | COMMUNITY
Start: 2022-08-01 | End: 1900-01-01

## 2024-05-20 RX ORDER — LISINOPRIL AND HYDROCHLOROTHIAZIDE TABLETS 20; 12.5 MG/1; MG/1
20-12.5 TABLET ORAL
Qty: 90 | Refills: 1 | Status: ACTIVE | COMMUNITY
Start: 2020-10-14 | End: 1900-01-01

## 2024-05-20 RX ORDER — ALLOPURINOL 100 MG/1
100 TABLET ORAL
Qty: 180 | Refills: 1 | Status: ACTIVE | COMMUNITY
Start: 2022-11-19 | End: 1900-01-01

## 2024-05-25 NOTE — HISTORY OF PRESENT ILLNESS
[FreeTextEntry1] : 43-year-old male with history of hypertension, gout Noted to have intermittently mildly elevated transaminases since at least 2016 (ALT ranges from normal to low 80s)  His primary physician has ruled out viral hepatitis (hepatitis B, C), autoimmune liver disease, Iron/copper deposition disease  No history of jaundice No complaints of abdominal pain Rare EtOH intake  Ultrasound with elastography (1/5/2023) reveals a moderately fatty liver, Metavir score F0 to F1.

## 2024-05-25 NOTE — ASSESSMENT
[FreeTextEntry1] :  We have discussed the diagnosis of NAFLD at length. I reviewed the natural history, evaluation and staging of the disease including his FibroScan results, and prognosis, including possible risks of development of compensated cirrhosis, decompensated cirrhosis. We have discussed that lifestyle modifications are crucial for management of NAFLD. I again recommended gradual weight loss of 10% of his body weight (~15-20 lbs), down to ~170 lbs. I recommended dietary changes, adherence to a Mediterranean style diet with increased consumption of vegetables and lean proteins, I recommended moderate intensity exercise for a minimum of 20-30 minutes at least 3 times per week. These changes have been shown to lead to regression or even resolution of steatosis, inflammation, and even fibrosis in some patients.  Check Garza fibro blood test, to corroborate elastography Having Metavir F0 to F1 is reassuring Follow-up in 1 year

## 2024-05-25 NOTE — REASON FOR VISIT
[Home] : at home, [unfilled] , at the time of the visit. [Medical Office: ([facility])___] : at the medical office located in  [Patient] : the patient [FreeTextEntry1] : Abnormal LFTs

## 2024-10-31 ENCOUNTER — NON-APPOINTMENT (OUTPATIENT)
Age: 44
End: 2024-10-31

## 2024-11-09 ENCOUNTER — APPOINTMENT (OUTPATIENT)
Dept: INTERNAL MEDICINE | Facility: CLINIC | Age: 44
End: 2024-11-09

## 2024-11-09 VITALS
HEART RATE: 66 BPM | OXYGEN SATURATION: 98 % | WEIGHT: 184 LBS | SYSTOLIC BLOOD PRESSURE: 120 MMHG | DIASTOLIC BLOOD PRESSURE: 90 MMHG | BODY MASS INDEX: 27.89 KG/M2 | TEMPERATURE: 97.6 F | HEIGHT: 68 IN

## 2024-11-09 DIAGNOSIS — Z00.00 ENCOUNTER FOR GENERAL ADULT MEDICAL EXAMINATION W/OUT ABNORMAL FINDINGS: ICD-10-CM

## 2024-11-09 DIAGNOSIS — Z12.5 ENCOUNTER FOR SCREENING FOR MALIGNANT NEOPLASM OF PROSTATE: ICD-10-CM

## 2024-11-09 PROBLEM — D72.829 LEUKOCYTOSIS: Status: ACTIVE | Noted: 2024-11-09

## 2024-11-09 PROCEDURE — 36415 COLL VENOUS BLD VENIPUNCTURE: CPT

## 2024-11-09 PROCEDURE — 99396 PREV VISIT EST AGE 40-64: CPT | Mod: 25

## 2024-11-09 PROCEDURE — 90656 IIV3 VACC NO PRSV 0.5 ML IM: CPT

## 2024-11-09 PROCEDURE — G0008: CPT

## 2024-11-09 PROCEDURE — 93000 ELECTROCARDIOGRAM COMPLETE: CPT

## 2024-11-11 ENCOUNTER — APPOINTMENT (OUTPATIENT)
Dept: CT IMAGING | Facility: CLINIC | Age: 44
End: 2024-11-11
Payer: SELF-PAY

## 2024-11-11 ENCOUNTER — OUTPATIENT (OUTPATIENT)
Dept: OUTPATIENT SERVICES | Facility: HOSPITAL | Age: 44
LOS: 1 days | End: 2024-11-11
Payer: SELF-PAY

## 2024-11-11 DIAGNOSIS — Z00.00 ENCOUNTER FOR GENERAL ADULT MEDICAL EXAMINATION WITHOUT ABNORMAL FINDINGS: ICD-10-CM

## 2024-11-11 PROCEDURE — 75571 CT HRT W/O DYE W/CA TEST: CPT | Mod: 26

## 2024-11-11 PROCEDURE — 75571 CT HRT W/O DYE W/CA TEST: CPT

## 2024-11-12 LAB
HCT VFR BLD CALC: 46.2 %
HGB BLD-MCNC: 15.1 G/DL
MCHC RBC-ENTMCNC: 29.8 PG
MCHC RBC-ENTMCNC: 32.7 G/DL
MCV RBC AUTO: 91.3 FL
PLATELET # BLD AUTO: 312 K/UL
RBC # BLD: 5.06 M/UL
RBC # FLD: 13.9 %
WBC # FLD AUTO: 9.41 K/UL

## 2024-11-14 ENCOUNTER — APPOINTMENT (OUTPATIENT)
Dept: INTERNAL MEDICINE | Facility: CLINIC | Age: 44
End: 2024-11-14
Payer: COMMERCIAL

## 2024-11-14 VITALS
HEART RATE: 75 BPM | OXYGEN SATURATION: 98 % | HEIGHT: 68 IN | WEIGHT: 184 LBS | SYSTOLIC BLOOD PRESSURE: 140 MMHG | BODY MASS INDEX: 27.89 KG/M2 | DIASTOLIC BLOOD PRESSURE: 90 MMHG

## 2024-11-14 DIAGNOSIS — R73.01 IMPAIRED FASTING GLUCOSE: ICD-10-CM

## 2024-11-14 DIAGNOSIS — R79.89 OTHER SPECIFIED ABNORMAL FINDINGS OF BLOOD CHEMISTRY: ICD-10-CM

## 2024-11-14 DIAGNOSIS — M10.9 GOUT, UNSPECIFIED: ICD-10-CM

## 2024-11-14 DIAGNOSIS — I25.10 ATHEROSCLEROTIC HEART DISEASE OF NATIVE CORONARY ARTERY W/OUT ANGINA PECTORIS: ICD-10-CM

## 2024-11-14 DIAGNOSIS — Z86.2 PERSONAL HISTORY OF DISEASES OF THE BLOOD AND BLOOD-FORMING ORGANS AND CERTAIN DISORDERS INVOLVING THE IMMUNE MECHANISM: ICD-10-CM

## 2024-11-14 DIAGNOSIS — E78.00 PURE HYPERCHOLESTEROLEMIA, UNSPECIFIED: ICD-10-CM

## 2024-11-14 DIAGNOSIS — I10 ESSENTIAL (PRIMARY) HYPERTENSION: ICD-10-CM

## 2024-11-14 PROCEDURE — G2211 COMPLEX E/M VISIT ADD ON: CPT | Mod: NC

## 2024-11-14 PROCEDURE — 99214 OFFICE O/P EST MOD 30 MIN: CPT

## 2024-11-14 RX ORDER — ROSUVASTATIN CALCIUM 10 MG/1
10 TABLET, FILM COATED ORAL
Qty: 90 | Refills: 2 | Status: ACTIVE | COMMUNITY
Start: 2024-11-14 | End: 1900-01-01

## 2025-01-16 ENCOUNTER — APPOINTMENT (OUTPATIENT)
Dept: ORTHOPEDIC SURGERY | Facility: CLINIC | Age: 45
End: 2025-01-16
Payer: COMMERCIAL

## 2025-01-16 DIAGNOSIS — M67.441 GANGLION, RIGHT HAND: ICD-10-CM

## 2025-01-16 PROCEDURE — 99204 OFFICE O/P NEW MOD 45 MIN: CPT | Mod: 25

## 2025-01-16 PROCEDURE — 20612 ASPIRATE/INJ GANGLION CYST: CPT | Mod: F7

## 2025-01-16 PROCEDURE — 73140 X-RAY EXAM OF FINGER(S): CPT | Mod: F7

## 2025-03-28 NOTE — ED ADULT TRIAGE NOTE - SPO2 (%)
Surinder Iglesias  1974  [id number]    Situation:  Verbal report given from: Coty FOX and CRNA  Procedure: * No procedures listed *    Background:    Preoperative diagnosis: * No pre-op diagnosis entered *    Postoperative diagnosis: * No post-op diagnosis entered *    :  Dr. Rosado anesthesia    Assistant(s): * No surgical staff found *    Specimens: * No specimens in log *    Assessment:  Intra-procedure medications         Anesthesia gave intra-procedure sedation and medications, see anesthesia flow sheet     Intravenous fluids: LR@ KVO     Vital signs stable. Pt has airway but breathing well on own. 935 Airway removed and pt complaining of heat-blankets removed.       Recommendation:    Permission to share finding with wife :  yes   [phone]

## 2025-04-23 LAB
ALBUMIN SERPL ELPH-MCNC: 4.6 G/DL
ALP BLD-CCNC: 95 U/L
ALT SERPL-CCNC: 34 U/L
ANION GAP SERPL CALC-SCNC: 13 MMOL/L
AST SERPL-CCNC: 31 U/L
BILIRUB SERPL-MCNC: 0.4 MG/DL
BUN SERPL-MCNC: 13 MG/DL
CALCIUM SERPL-MCNC: 9.6 MG/DL
CHLORIDE SERPL-SCNC: 106 MMOL/L
CHOLEST SERPL-MCNC: 118 MG/DL
CO2 SERPL-SCNC: 21 MMOL/L
CREAT SERPL-MCNC: 0.84 MG/DL
EGFRCR SERPLBLD CKD-EPI 2021: 110 ML/MIN/1.73M2
ESTIMATED AVERAGE GLUCOSE: 120 MG/DL
GLUCOSE SERPL-MCNC: 102 MG/DL
HBA1C MFR BLD HPLC: 5.8 %
HDLC SERPL-MCNC: 52 MG/DL
LDLC SERPL-MCNC: 49 MG/DL
NONHDLC SERPL-MCNC: 66 MG/DL
POTASSIUM SERPL-SCNC: 4.5 MMOL/L
PROT SERPL-MCNC: 7 G/DL
SODIUM SERPL-SCNC: 141 MMOL/L
TRIGL SERPL-MCNC: 90 MG/DL
URATE SERPL-MCNC: 6.6 MG/DL

## 2025-05-03 ENCOUNTER — APPOINTMENT (OUTPATIENT)
Dept: INTERNAL MEDICINE | Facility: CLINIC | Age: 45
End: 2025-05-03
Payer: COMMERCIAL

## 2025-05-03 VITALS
SYSTOLIC BLOOD PRESSURE: 118 MMHG | WEIGHT: 183 LBS | TEMPERATURE: 97.7 F | OXYGEN SATURATION: 98 % | BODY MASS INDEX: 27.83 KG/M2 | DIASTOLIC BLOOD PRESSURE: 79 MMHG | HEART RATE: 70 BPM

## 2025-05-03 DIAGNOSIS — E78.00 PURE HYPERCHOLESTEROLEMIA, UNSPECIFIED: ICD-10-CM

## 2025-05-03 DIAGNOSIS — M67.441 GANGLION, RIGHT HAND: ICD-10-CM

## 2025-05-03 DIAGNOSIS — M10.9 GOUT, UNSPECIFIED: ICD-10-CM

## 2025-05-03 DIAGNOSIS — R73.01 IMPAIRED FASTING GLUCOSE: ICD-10-CM

## 2025-05-03 DIAGNOSIS — R79.89 OTHER SPECIFIED ABNORMAL FINDINGS OF BLOOD CHEMISTRY: ICD-10-CM

## 2025-05-03 DIAGNOSIS — I25.10 ATHEROSCLEROTIC HEART DISEASE OF NATIVE CORONARY ARTERY W/OUT ANGINA PECTORIS: ICD-10-CM

## 2025-05-03 DIAGNOSIS — I10 ESSENTIAL (PRIMARY) HYPERTENSION: ICD-10-CM

## 2025-05-03 PROCEDURE — G2211 COMPLEX E/M VISIT ADD ON: CPT | Mod: NC

## 2025-05-03 PROCEDURE — 99214 OFFICE O/P EST MOD 30 MIN: CPT
